# Patient Record
Sex: FEMALE | Race: OTHER | NOT HISPANIC OR LATINO | URBAN - METROPOLITAN AREA
[De-identification: names, ages, dates, MRNs, and addresses within clinical notes are randomized per-mention and may not be internally consistent; named-entity substitution may affect disease eponyms.]

---

## 2022-06-15 ENCOUNTER — INPATIENT (INPATIENT)
Facility: HOSPITAL | Age: 82
LOS: 13 days | Discharge: HOSPICE | DRG: 698 | End: 2022-06-29
Attending: HOSPITALIST | Admitting: HOSPITALIST
Payer: MEDICARE

## 2022-06-15 VITALS
WEIGHT: 143.08 LBS | RESPIRATION RATE: 18 BRPM | SYSTOLIC BLOOD PRESSURE: 143 MMHG | HEIGHT: 64 IN | HEART RATE: 78 BPM | OXYGEN SATURATION: 97 % | DIASTOLIC BLOOD PRESSURE: 54 MMHG | TEMPERATURE: 98 F

## 2022-06-15 DIAGNOSIS — N17.9 ACUTE KIDNEY FAILURE, UNSPECIFIED: ICD-10-CM

## 2022-06-15 LAB
ALBUMIN SERPL ELPH-MCNC: 3 G/DL — LOW (ref 3.5–5)
ALP SERPL-CCNC: 163 U/L — HIGH (ref 40–120)
ALT FLD-CCNC: 456 U/L DA — HIGH (ref 10–60)
ANION GAP SERPL CALC-SCNC: 18 MMOL/L — HIGH (ref 5–17)
AST SERPL-CCNC: 455 U/L — HIGH (ref 10–40)
BASOPHILS # BLD AUTO: 0.01 K/UL — SIGNIFICANT CHANGE UP (ref 0–0.2)
BASOPHILS NFR BLD AUTO: 0.2 % — SIGNIFICANT CHANGE UP (ref 0–2)
BILIRUB DIRECT SERPL-MCNC: 0.1 MG/DL — SIGNIFICANT CHANGE UP (ref 0–0.3)
BILIRUB INDIRECT FLD-MCNC: 0.4 MG/DL — SIGNIFICANT CHANGE UP (ref 0.2–1)
BILIRUB SERPL-MCNC: 0.5 MG/DL — SIGNIFICANT CHANGE UP (ref 0.2–1.2)
BUN SERPL-MCNC: 93 MG/DL — HIGH (ref 7–18)
CALCIUM SERPL-MCNC: 7.2 MG/DL — LOW (ref 8.4–10.5)
CHLORIDE SERPL-SCNC: 108 MMOL/L — SIGNIFICANT CHANGE UP (ref 96–108)
CO2 SERPL-SCNC: 8 MMOL/L — CRITICAL LOW (ref 22–31)
CREAT SERPL-MCNC: 8.28 MG/DL — HIGH (ref 0.5–1.3)
EGFR: 5 ML/MIN/1.73M2 — LOW
EOSINOPHIL # BLD AUTO: 0.01 K/UL — SIGNIFICANT CHANGE UP (ref 0–0.5)
EOSINOPHIL NFR BLD AUTO: 0.2 % — SIGNIFICANT CHANGE UP (ref 0–6)
GAS PNL BLDV: SIGNIFICANT CHANGE UP
GLUCOSE SERPL-MCNC: 82 MG/DL — SIGNIFICANT CHANGE UP (ref 70–99)
HCT VFR BLD CALC: 29.6 % — LOW (ref 34.5–45)
HGB BLD-MCNC: 9.1 G/DL — LOW (ref 11.5–15.5)
HIV 1 & 2 AB SERPL IA.RAPID: SIGNIFICANT CHANGE UP
IMM GRANULOCYTES NFR BLD AUTO: 1.5 % — SIGNIFICANT CHANGE UP (ref 0–1.5)
LACTATE SERPL-SCNC: 0.3 MMOL/L — LOW (ref 0.7–2)
LIDOCAIN IGE QN: 560 U/L — HIGH (ref 73–393)
LYMPHOCYTES # BLD AUTO: 1.61 K/UL — SIGNIFICANT CHANGE UP (ref 1–3.3)
LYMPHOCYTES # BLD AUTO: 24.9 % — SIGNIFICANT CHANGE UP (ref 13–44)
MAGNESIUM SERPL-MCNC: 2.5 MG/DL — SIGNIFICANT CHANGE UP (ref 1.6–2.6)
MCHC RBC-ENTMCNC: 27.4 PG — SIGNIFICANT CHANGE UP (ref 27–34)
MCHC RBC-ENTMCNC: 30.7 GM/DL — LOW (ref 32–36)
MCV RBC AUTO: 89.2 FL — SIGNIFICANT CHANGE UP (ref 80–100)
MONOCYTES # BLD AUTO: 0.35 K/UL — SIGNIFICANT CHANGE UP (ref 0–0.9)
MONOCYTES NFR BLD AUTO: 5.4 % — SIGNIFICANT CHANGE UP (ref 2–14)
NEUTROPHILS # BLD AUTO: 4.38 K/UL — SIGNIFICANT CHANGE UP (ref 1.8–7.4)
NEUTROPHILS NFR BLD AUTO: 67.8 % — SIGNIFICANT CHANGE UP (ref 43–77)
NRBC # BLD: 0 /100 WBCS — SIGNIFICANT CHANGE UP (ref 0–0)
OB PNL STL: POSITIVE
PLATELET # BLD AUTO: 152 K/UL — SIGNIFICANT CHANGE UP (ref 150–400)
POTASSIUM SERPL-MCNC: 6.3 MMOL/L — CRITICAL HIGH (ref 3.5–5.3)
POTASSIUM SERPL-SCNC: 6.3 MMOL/L — CRITICAL HIGH (ref 3.5–5.3)
PROT SERPL-MCNC: 7.4 G/DL — SIGNIFICANT CHANGE UP (ref 6–8.3)
RBC # BLD: 3.32 M/UL — LOW (ref 3.8–5.2)
RBC # FLD: 17.4 % — HIGH (ref 10.3–14.5)
SARS-COV-2 RNA SPEC QL NAA+PROBE: DETECTED
SODIUM SERPL-SCNC: 134 MMOL/L — LOW (ref 135–145)
TROPONIN I, HIGH SENSITIVITY RESULT: 24.8 NG/L — SIGNIFICANT CHANGE UP
WBC # BLD: 6.46 K/UL — SIGNIFICANT CHANGE UP (ref 3.8–10.5)
WBC # FLD AUTO: 6.46 K/UL — SIGNIFICANT CHANGE UP (ref 3.8–10.5)

## 2022-06-15 PROCEDURE — 99291 CRITICAL CARE FIRST HOUR: CPT | Mod: CS

## 2022-06-15 PROCEDURE — 71045 X-RAY EXAM CHEST 1 VIEW: CPT | Mod: 26

## 2022-06-15 RX ORDER — SODIUM ZIRCONIUM CYCLOSILICATE 10 G/10G
10 POWDER, FOR SUSPENSION ORAL ONCE
Refills: 0 | Status: COMPLETED | OUTPATIENT
Start: 2022-06-15 | End: 2022-06-15

## 2022-06-15 RX ORDER — DEXTROSE 50 % IN WATER 50 %
25 SYRINGE (ML) INTRAVENOUS ONCE
Refills: 0 | Status: COMPLETED | OUTPATIENT
Start: 2022-06-15 | End: 2022-06-15

## 2022-06-15 RX ORDER — PIPERACILLIN AND TAZOBACTAM 4; .5 G/20ML; G/20ML
3.38 INJECTION, POWDER, LYOPHILIZED, FOR SOLUTION INTRAVENOUS ONCE
Refills: 0 | Status: COMPLETED | OUTPATIENT
Start: 2022-06-15 | End: 2022-06-15

## 2022-06-15 RX ORDER — ALBUTEROL 90 UG/1
2.5 AEROSOL, METERED ORAL ONCE
Refills: 0 | Status: COMPLETED | OUTPATIENT
Start: 2022-06-15 | End: 2022-06-15

## 2022-06-15 RX ORDER — CHLORHEXIDINE GLUCONATE 213 G/1000ML
1 SOLUTION TOPICAL
Refills: 0 | Status: DISCONTINUED | OUTPATIENT
Start: 2022-06-15 | End: 2022-06-17

## 2022-06-15 RX ORDER — SODIUM CHLORIDE 9 MG/ML
1000 INJECTION INTRAMUSCULAR; INTRAVENOUS; SUBCUTANEOUS ONCE
Refills: 0 | Status: COMPLETED | OUTPATIENT
Start: 2022-06-15 | End: 2022-06-15

## 2022-06-15 RX ORDER — PANTOPRAZOLE SODIUM 20 MG/1
40 TABLET, DELAYED RELEASE ORAL EVERY 12 HOURS
Refills: 0 | Status: DISCONTINUED | OUTPATIENT
Start: 2022-06-15 | End: 2022-06-16

## 2022-06-15 RX ORDER — INSULIN HUMAN 100 [IU]/ML
5 INJECTION, SOLUTION SUBCUTANEOUS ONCE
Refills: 0 | Status: COMPLETED | OUTPATIENT
Start: 2022-06-15 | End: 2022-06-15

## 2022-06-15 RX ORDER — DIATRIZOATE MEGLUMINE 180 MG/ML
30 INJECTION, SOLUTION INTRAVESICAL ONCE
Refills: 0 | Status: DISCONTINUED | OUTPATIENT
Start: 2022-06-15 | End: 2022-06-15

## 2022-06-15 RX ORDER — SODIUM CHLORIDE 9 MG/ML
1000 INJECTION, SOLUTION INTRAVENOUS
Refills: 0 | Status: DISCONTINUED | OUTPATIENT
Start: 2022-06-15 | End: 2022-06-17

## 2022-06-15 RX ORDER — CALCIUM GLUCONATE 100 MG/ML
2 VIAL (ML) INTRAVENOUS ONCE
Refills: 0 | Status: COMPLETED | OUTPATIENT
Start: 2022-06-15 | End: 2022-06-15

## 2022-06-15 RX ORDER — PIPERACILLIN AND TAZOBACTAM 4; .5 G/20ML; G/20ML
3.38 INJECTION, POWDER, LYOPHILIZED, FOR SOLUTION INTRAVENOUS EVERY 12 HOURS
Refills: 0 | Status: DISCONTINUED | OUTPATIENT
Start: 2022-06-15 | End: 2022-06-18

## 2022-06-15 RX ORDER — SODIUM BICARBONATE 1 MEQ/ML
100 SYRINGE (ML) INTRAVENOUS ONCE
Refills: 0 | Status: COMPLETED | OUTPATIENT
Start: 2022-06-15 | End: 2022-06-15

## 2022-06-15 RX ADMIN — PIPERACILLIN AND TAZOBACTAM 3.38 GRAM(S): 4; .5 INJECTION, POWDER, LYOPHILIZED, FOR SOLUTION INTRAVENOUS at 23:50

## 2022-06-15 RX ADMIN — Medication 25 GRAM(S): at 23:06

## 2022-06-15 RX ADMIN — PIPERACILLIN AND TAZOBACTAM 200 GRAM(S): 4; .5 INJECTION, POWDER, LYOPHILIZED, FOR SOLUTION INTRAVENOUS at 23:06

## 2022-06-15 RX ADMIN — ALBUTEROL 2.5 MILLIGRAM(S): 90 AEROSOL, METERED ORAL at 23:34

## 2022-06-15 RX ADMIN — PANTOPRAZOLE SODIUM 40 MILLIGRAM(S): 20 TABLET, DELAYED RELEASE ORAL at 23:05

## 2022-06-15 RX ADMIN — Medication 200 GRAM(S): at 23:05

## 2022-06-15 RX ADMIN — INSULIN HUMAN 5 UNIT(S): 100 INJECTION, SOLUTION SUBCUTANEOUS at 23:06

## 2022-06-15 RX ADMIN — SODIUM CHLORIDE 1000 MILLILITER(S): 9 INJECTION INTRAMUSCULAR; INTRAVENOUS; SUBCUTANEOUS at 22:28

## 2022-06-15 RX ADMIN — Medication 2 GRAM(S): at 23:35

## 2022-06-15 RX ADMIN — SODIUM CHLORIDE 1000 MILLILITER(S): 9 INJECTION INTRAMUSCULAR; INTRAVENOUS; SUBCUTANEOUS at 21:28

## 2022-06-15 RX ADMIN — Medication 100 MILLIEQUIVALENT(S): at 23:06

## 2022-06-15 NOTE — H&P ADULT - ATTENDING COMMENTS
80 y/o with PMH of HTN, CVA, HLD, CKD brought to ER with poor appetite, vomiting and diarrhea.    A/P ARF on top of CKD   Hyper K   GI Bleed   Anemia  COVID    MICU   Bicarb drip   Monitor renal function, urinary output  Correct hyperK   NPO   Iv protonix   PPI BID   Monitor CBC  Nephrology eval appreciated  Abd US   Panculure   Iv antibiotic

## 2022-06-15 NOTE — H&P ADULT - ASSESSMENT
81 year old female from home with HHA, with PMH of CVA, HTN, HLD, SBO and renal insufficiency in the past, later improved, chronic Perez? presents to the ED with c/o vomiting, diarrhea and cough x 2 days. In ED, K was 6.3, Cr 8.28, bicarb 8. Hb 9.0. Occult +. COVID +. Admitted to ICU for acute renal failure and metabolic acidosis.    ASSESSMENT:  -Acute renal failure  -Hyperkalemia  -Anion gap metabolic acidosis  -GI bleed  -COVID infection  -Transaminitis  -Anemia    PLAN:    #NEURO    #PULMONARY    #CARDIOVASCULAR    #GASTROINTESTINAL    #RENAL    #INFECTIOUS DISEASE    #ENDOCRINE    #HEME    #  PROPHYLAXIS  -DVT  -GI    DISPO  CODE STATUS 81 year old female from home with HHA, with PMH of CVA, HTN, HLD, SBO and renal insufficiency in the past, later improved, chronic Perez? presents to the ED with c/o vomiting, diarrhea and cough x 2 days. In ED, K was 6.3, Cr 8.28, bicarb 8. Hb 9.0. Occult +. COVID +. Admitted to ICU for acute renal failure and metabolic acidosis.    ASSESSMENT:  -Acute renal failure  -Hyperkalemia  -Anion gap metabolic acidosis  -GI bleed  -COVID infection  -Transaminitis  -Anemia    PLAN:    #NEURO  pt is AAO X 2-at baseline mental status  a bit more lethargic likely due to uremia    #PULMONARY    COVID 19 infection:  pt saturating well on RA  no indication for steroids or remdesivir  monitor oxygen sats    #CARDIOVASCULAR    #GASTROINTESTINAL    #RENAL    #INFECTIOUS DISEASE    #ENDOCRINE    #HEME    #  PROPHYLAXIS  -DVT  -GI    DISPO  CODE STATUS 81 year old female from home with HHA, with PMH of CVA, HTN, HLD, SBO and renal insufficiency in the past, later improved, chronic Perez? presents to the ED with c/o vomiting, diarrhea and cough x 2 days. In ED, K was 6.3, Cr 8.28, bicarb 8. Hb 9.0. Occult +. COVID +. Admitted to ICU for acute renal failure and metabolic acidosis.    ASSESSMENT:  -Acute renal failure  -Hyperkalemia  -Anion gap metabolic acidosis  -GI bleed  -COVID infection  -Transaminitis  -Anemia    PLAN:    #NEURO  pt is AAO X 2-at baseline mental status  a bit more lethargic likely due to uremia    #PULMONARY    COVID 19 infection:  pt saturating well on RA  no indication for steroids or remdesivir  monitor oxygen sats    #CARDIOVASCULAR  no acute issues    #GASTROINTESTINAL    GI bleed:  pt p/w Hb 9.1  occult +  reported to have dark urine   keep NPO  c/w IVF  Protonix drip  trend CBC  GI consult in am     Transaminitis:  pt p/w ALT//455  CT abd pending  pt also has elevated lipase 560  RUQ sono if CT unable to be obtained  trend BMP    #RENAL    Acute renal failure:  pt p/w Cr 8.28, BUN 93, bicarb 8, pH 7.04  according to son, pt Cr was normal one month ago  follow UA, urine lytes  follow CT abd  pt does not want HD  nephro consulted Dr Coyle   started on bicarb drip  follow repeat BMP    Hyperkalemia:  K 6.3 on admission  s/p insulin, dextrose, lokelma, calcium gluconate  follow repeat BMP  repeat cocktail as needed  nephro on board Dr. Coyle     #INFECTIOUS DISEASE  Urine looks very dark-concern for infection given h/o UTI  started empirically on zosyn for concern of pyelo  follow urine and blood cultures     #ENDOCRINE  no acute issues     #HEME    Anemia:  p/w Hb 9.1   occult +   management as above  trend CBC q12h     #  PROPHYLAXIS  -DVT: SCD  -GI: protonix     DISPO: admit to ICU  CODE STATUS: DNR/DNI/no central line  81 year old female from home with HHA, with PMH of CVA, HTN, HLD, SBO and renal insufficiency in the past, later improved, chronic Perez? presents to the ED with c/o vomiting, diarrhea and cough x 2 days. In ED, K was 6.3, Cr 8.28, bicarb 8. Hb 9.0. Occult +. COVID +. Admitted to ICU for acute renal failure and metabolic acidosis.    ASSESSMENT:  -Acute renal failure  -Hyperkalemia  -Anion gap metabolic acidosis  -GI bleed  -COVID infection  -Transaminitis  -Anemia    PLAN:    #NEURO  pt is AAO X 2-at baseline mental status  a bit more lethargic likely due to uremia    #PULMONARY    COVID 19 infection:  pt saturating well on RA  no indication for steroids or remdesivir  monitor oxygen sats    #CARDIOVASCULAR  no acute issues    #GASTROINTESTINAL    GI bleed:  pt p/w Hb 9.1  occult +  reported to have dark urine   keep NPO  c/w IVF  Protonix drip  trend CBC  GI consult in am     Transaminitis:  pt p/w ALT//455  CT abd pending  pt also has elevated lipase 560  RUQ sono if CT unable to be obtained  trend BMP    #RENAL    Acute renal failure:  pt p/w Cr 8.28, BUN 93, bicarb 8, pH 7.04  according to son, pt Cr was normal one month ago  follow UA, urine lytes  follow CT abd  pt does not want HD  nephro consulted Dr Coyle   started on bicarb drip  follow repeat BMP    Hyperkalemia:  K 6.3 on admission  s/p insulin, dextrose, lokelma, calcium gluconate  follow repeat BMP  repeat cocktail as needed  nephro on board Dr. Coyle     #INFECTIOUS DISEASE  Urine looks very dark-concern for infection given h/o UTI  started empirically on zosyn for concern of pyelo  follow urine and blood cultures     #ENDOCRINE  no acute issues     #HEME    Anemia:  p/w Hb 9.1   occult +   management as above  trend CBC q8h     #  PROPHYLAXIS  -DVT: SCD  -GI: protonix     DISPO: admit to ICU  CODE STATUS: DNR/DNI/no central line

## 2022-06-15 NOTE — ED PROVIDER NOTE - SECONDARY DIAGNOSIS.
LIAM (acute kidney injury) Acute hyperkalemia Increased anion gap metabolic acidosis DNR (do not resuscitate) discussion 2019 novel coronavirus disease (COVID-19) Protein calorie malnutrition

## 2022-06-15 NOTE — ED PROVIDER NOTE - OBJECTIVE STATEMENT
81yoF with h/o ESRD, not on dialysis 2/2 to her own refusal (and was on hospice until her kidney function improved, though is still DNR/DNI), CVA, HTN, HLD, h/o SBO, on no antiplt/coag agents, presents with her son Reid ([phone]) due to vomiting NBNB emesis since yesterday, as well as black tarry stool, and nonradiating L flank pain. Associated cough. Took Tylenol a few hrs PTA.

## 2022-06-15 NOTE — ED ADULT NURSE NOTE - NSHOSCREENINGQ1_ED_ALL_ED
SUBJECTIVE:  Patient stated that on April 30, she noticed a bruise over her right breast. She did a breast exam and noticed a lump over the area of the bruise. There is no tenderness of the area nor discomfort.    PHYSICAL EXAMINATION:  Vital Signs:   Visit Vitals   • /84   • Pulse 76   • SpO2 99%     Ecchymosis noted over right breast over the medial lower quadrant. There is a discrete mobile nodule noted in the area of the bruise that is nontender. This is not noted in the contralateral breast in the same area.    IMPRESSION AND PLAN:  Ecchymosis with associated breast lesion: Evaluate further with ultrasound and mammography.    She should also would like a refill of the Valtrex for recurrent cold sores. Refill sent to pharmacy.    
Sarina Doran is a 63 year old female presenting with c/o bruise and lump on her right breast x 3 days.    Medications reviewed and updated.  Denies known Latex allergy or symptoms of Latex sensitivity.  History   Smoking Status   • Former Smoker   • Packs/day: 1.00   • Years: 40.00   • Types: Cigarettes   • Quit date: 8/1/2012   Smokeless Tobacco   • Never Used     Comment: several couple of cigs- 1ppd     Health Maintenance Summary     Topic Due On Due Status Completed On Postpone Until Reason    MAMMOGRAM - BREAST CANCER SCREENING May 18, 2018 Not Due May 18, 2016      Colorectal Cancer Screening - Colonoscopy Feb 27, 2025 Not Due Feb 27, 2015      Pap Smear - Cervical Cancer Screening  May 2, 2021 Not Due May 2, 2016      Immunization-Zoster Jun 13, 2014 Postponed  Nov 28, 2017 Insurance or Financial    Immunization - TDAP Pregnancy  Hidden       IMMUNIZATION - DTaP/Tdap/Td Apr 12, 2023 Not Due Apr 12, 2013      Immunization-Influenza  Completed Nov 17, 2016            Patient is due for topics as listed above, she wishes to discuss with provider .        
No

## 2022-06-15 NOTE — H&P ADULT - NSHPPHYSICALEXAM_GEN_ALL_CORE
PHYSICAL EXAM:  GENERAL: NAD, lying in bed comfortably, warm to touch   HEAD:  Atraumatic, Normocephalic  EYES: EOMI, PERRLA, conjunctiva and sclera clear  ENT: Moist mucous membranes  NECK: Supple, No JVD  CHEST/LUNG: Clear to auscultation bilaterally  HEART: Regular rate and rhythm; systolic murmur  ABDOMEN: Soft, Nontender, Nondistended.  EXTREMITIES:  No clubbing, cyanosis, or edema  : Perez in place with black urine  NERVOUS SYSTEM:  Alert & Oriented X2-3    SKIN: No rashes or lesions

## 2022-06-15 NOTE — ED PROVIDER NOTE - CARE PLAN
Principal Discharge DX:	Acute renal failure  Secondary Diagnosis:	LIAM (acute kidney injury)  Secondary Diagnosis:	Acute hyperkalemia   1 Principal Discharge DX:	Acute renal failure  Secondary Diagnosis:	LIAM (acute kidney injury)  Secondary Diagnosis:	Acute hyperkalemia  Secondary Diagnosis:	Increased anion gap metabolic acidosis  Secondary Diagnosis:	2019 novel coronavirus disease (COVID-19)  Secondary Diagnosis:	DNR (do not resuscitate) discussion  Secondary Diagnosis:	Protein calorie malnutrition

## 2022-06-15 NOTE — ED PROVIDER NOTE - PHYSICAL EXAMINATION
Afebrile, hemodynamically stable, saturating well  NAD, elderly, nontoxic appearing, laying comfortably in bed, no WOB, speaking full sentences  Head NCAT  EOMI grossly, anicteric  MM dry  No JVD  RRR, nml S1/S2, no m/r/g  Lungs CTAB, no w/r/r  Abd soft, mild LUQ TTP, ND, nml BS, no rebound or guarding, + L CVAT  Rectal: brown stool  AAO, CN's 3-12 grossly intact  PARK spontaneously, no leg cyanosis or edema  Skin warm, dry, no rashes or hives

## 2022-06-15 NOTE — ED ADULT NURSE NOTE - NSIMPLEMENTINTERV_GEN_ALL_ED
Implemented All Fall Risk Interventions:  [place] to call system. Call bell, personal items and telephone within reach. Instruct patient to call for assistance. Room bathroom lighting operational. Non-slip footwear when patient is off stretcher. Physically safe environment: no spills, clutter or unnecessary equipment. Stretcher in lowest position, wheels locked, appropriate side rails in place. Provide visual cue, wrist band, yellow gown, etc. Monitor gait and stability. Monitor for mental status changes and reorient to person, place, and time. Review medications for side effects contributing to fall risk. Reinforce activity limits and safety measures with patient and family.

## 2022-06-15 NOTE — CONSULT NOTE ADULT - SUBJECTIVE AND OBJECTIVE BOX
MAXIMINO MASCORRO  81y  Patient is a 81y old  Female who presents with a chief complaint of   HPI:  I was asked to see this patient for Hyperkalemia and LIAM.  As per her son, she was told she had ESRD and she refused HD, opting for hospice. However, her Cr fell to 2 recently. Cause of ESRD was unknown but was suspected to be due to obstruction as he had an indwelling cardoso since. She now has very dark urine in the cardoso for the last few days. No fever or chills but has flank pains.  HEALTH ISSUES - PROBLEM Dx:        MEDICATIONS  (STANDING):  ALBUTerol    0.083%. 2.5 milliGRAM(s) Nebulizer once  calcium gluconate IVPB 2 Gram(s) IV Intermittent Once  dextrose 50% Injectable 25 Gram(s) IV Push once  insulin regular  human recombinant. 5 Unit(s) IV Push once  pantoprazole  Injectable 40 milliGRAM(s) IV Push every 12 hours  piperacillin/tazobactam IVPB... 3.375 Gram(s) IV Intermittent once  sodium bicarbonate  Injectable 100 milliEquivalent(s) IV Push Once  sodium zirconium cyclosilicate 10 Gram(s) Oral Once    MEDICATIONS  (PRN):    Vital Signs Last 24 Hrs  T(C): 36.3 (15 Hossein 2022 20:22), Max: 36.7 (15 Hossein 2022 19:27)  T(F): 97.3 (15 Hossein 2022 20:22), Max: 98.1 (15 Hossein 2022 19:27)  HR: 78 (15 Hossein 2022 19:27) (78 - 78)  BP: 143/54 (15 Hossein 2022 19:27) (143/54 - 143/54)  BP(mean): --  RR: 18 (15 Hossein 2022 19:27) (18 - 18)  SpO2: 97% (15 Hossein 2022 19:27) (97% - 97%)  Daily Height in cm: 162.56 (15 Hossein 2022 19:27)    Daily     PHYSICAL EXAM:  Constitutional:  She appears comfortable and not distressed. Not diaphoretic.    Neck:  The thyroid is normal. Trachea is midline.     Respiratory: The lungs are clear to auscultation. No dullness and expansion is normal.    Cardiovascular: S1 and S2 are normal. No mummurs, rubs or gallops are present.    Gastrointestinal: The abdomen is soft. No tenderness is present. No masses are present. Bowel sounds are normal.    Genitourinary: The bladder is not distended. No CVA tenderness is present. Cardoso with very dark urine.    Extremities: No edema is noted. No deformities are present.    Neurological: Cognition is normal. Tone, power and sensation are normal. [place] of hearing.    Skin: No lesions are seen  or palpated.    Lymph Nodes: No lymphadenopathy is present.                              9.1    6.46  )-----------( 152      ( 15 Hossein 2022 21:07 )             29.6     06-15    134<L>  |  108  |  93<H>  ----------------------------<  82  6.3<HH>   |  8<LL>  |  8.28<H>    Ca    7.2<L>      15 Hossein 2022 21:07  Mg     2.5     06-15    TPro  7.4  /  Alb  3.0<L>  /  TBili  0.5  /  DBili  0.1  /  AST  455<H>  /  ALT  456<H>  /  AlkPhos  163<H>  06-15

## 2022-06-15 NOTE — ED ADULT NURSE NOTE - NSICDXPASTMEDICALHX_GEN_ALL_CORE_FT
PAST MEDICAL HISTORY:  CRI (chronic renal insufficiency)     CVA (cerebrovascular accident)     HTN (hypertension)     SBO (small bowel obstruction)

## 2022-06-15 NOTE — ED ADULT NURSE NOTE - CAS TRG GENERAL AIRWAY, MLM
CILOSTAZOL 100 MG 2 TIMES A DAY 90 DAY SUPPLY      [facility]       REQUESTING A 14 DAY SUPPLY TO BE SENT TO [facility], BEFORE MEDICATIONS COME IN THROUGH [facility].       Patent

## 2022-06-15 NOTE — ED PROVIDER NOTE - CLINICAL SUMMARY MEDICAL DECISION MAKING FREE TEXT BOX
Character low Character low suspicion for acute intraabdominal process or for dissection/AAA or mesenteric ischemia. Vomiting/dark stools c/w cause of her noted LIAM. No active bleeding. Given fluids, meds for hyperkalemia. DNR/DNI/no dialysis. Nephro saw pt. NAD, hemodynamically stable. Admitted to ICU.

## 2022-06-15 NOTE — H&P ADULT - HISTORY OF PRESENT ILLNESS
81yoF with h/o ESRD, not on dialysis 2/2 to her own refusal (and was on hospice until her kidney function improved, though is still DNR/DNI), CVA, HTN, HLD, h/o SBO, on no antiplt/coag agents, presents with her son Reid ([phone]) due to vomiting NBNB emesis since yesterday, as well as black tarry stool, and nonradiating L flank pain. Associated cough. Took Tylenol a few hrs PTA. 81 year old female from home with HHA, with PMH of CVA, HTN, HLD, SBO and renal insufficiency in the past, later improved, chronic Perez? presents to the ED with c/o vomiting. As per son Reid ([phone]), patient has been having vomiting x 2 days- non-bloody and white in color. She also has been having diarrhea x 2 days, cough with phlegm but no fever. He also reports her urine being much darker but very little in amount. Patient had ESRD in the past and was offered HD but refused and was discharged home with home hospice. However, she got better and was taken off hospice. Patient remains DNR/ DNI/ no hemodialysis or central line.

## 2022-06-16 LAB
ALBUMIN SERPL ELPH-MCNC: 2.9 G/DL — LOW (ref 3.5–5)
ALP SERPL-CCNC: 149 U/L — HIGH (ref 40–120)
ALT FLD-CCNC: 399 U/L DA — HIGH (ref 10–60)
ANION GAP SERPL CALC-SCNC: 19 MMOL/L — HIGH (ref 5–17)
APPEARANCE UR: ABNORMAL
APTT BLD: 46.2 SEC — HIGH (ref 27.5–35.5)
AST SERPL-CCNC: 350 U/L — HIGH (ref 10–40)
BASE EXCESS BLDV CALC-SCNC: -17.1 MMOL/L — SIGNIFICANT CHANGE UP
BILIRUB SERPL-MCNC: 0.5 MG/DL — SIGNIFICANT CHANGE UP (ref 0.2–1.2)
BILIRUB UR-MCNC: NEGATIVE — SIGNIFICANT CHANGE UP
BLOOD GAS COMMENTS, VENOUS: SIGNIFICANT CHANGE UP
BUN SERPL-MCNC: 93 MG/DL — HIGH (ref 7–18)
CA-I SERPL-SCNC: SIGNIFICANT CHANGE UP MMOL/L (ref 1.15–1.33)
CALCIUM SERPL-MCNC: 7.8 MG/DL — LOW (ref 8.4–10.5)
CHLORIDE SERPL-SCNC: 110 MMOL/L — HIGH (ref 96–108)
CO2 SERPL-SCNC: 10 MMOL/L — CRITICAL LOW (ref 22–31)
COLOR SPEC: ABNORMAL
CREAT ?TM UR-MCNC: 55 MG/DL — SIGNIFICANT CHANGE UP
CREAT SERPL-MCNC: 8.15 MG/DL — HIGH (ref 0.5–1.3)
DIFF PNL FLD: ABNORMAL
EGFR: 5 ML/MIN/1.73M2 — LOW
GAS PNL BLDV: 136 MMOL/L — SIGNIFICANT CHANGE UP (ref 136–145)
GAS PNL BLDV: SIGNIFICANT CHANGE UP
GLUCOSE SERPL-MCNC: 91 MG/DL — SIGNIFICANT CHANGE UP (ref 70–99)
GLUCOSE UR QL: NEGATIVE — SIGNIFICANT CHANGE UP
HCO3 BLDV-SCNC: 11 MMOL/L — LOW (ref 22–29)
HCT VFR BLD CALC: 28 % — LOW (ref 34.5–45)
HGB BLD-MCNC: 8.4 G/DL — LOW (ref 11.5–15.5)
HOROWITZ INDEX BLDV+IHG-RTO: 21 — SIGNIFICANT CHANGE UP
INR BLD: 1.02 RATIO — SIGNIFICANT CHANGE UP (ref 0.88–1.16)
KETONES UR-MCNC: NEGATIVE — SIGNIFICANT CHANGE UP
LACTATE BLDV-MCNC: 1.4 MMOL/L — SIGNIFICANT CHANGE UP (ref 0.5–2)
LEUKOCYTE ESTERASE UR-ACNC: ABNORMAL
MAGNESIUM SERPL-MCNC: 2.4 MG/DL — SIGNIFICANT CHANGE UP (ref 1.6–2.6)
MCHC RBC-ENTMCNC: 26.5 PG — LOW (ref 27–34)
MCHC RBC-ENTMCNC: 30 GM/DL — LOW (ref 32–36)
MCV RBC AUTO: 88.3 FL — SIGNIFICANT CHANGE UP (ref 80–100)
MRSA PCR RESULT.: SIGNIFICANT CHANGE UP
NITRITE UR-MCNC: NEGATIVE — SIGNIFICANT CHANGE UP
NRBC # BLD: 0 /100 WBCS — SIGNIFICANT CHANGE UP (ref 0–0)
OSMOLALITY UR: 306 MOS/KG — SIGNIFICANT CHANGE UP (ref 50–1200)
PCO2 BLDV: 33 MMHG — LOW (ref 39–42)
PH BLDV: 7.13 — LOW (ref 7.32–7.43)
PH UR: 9 — HIGH (ref 5–8)
PHOSPHATE SERPL-MCNC: 6.4 MG/DL — HIGH (ref 2.5–4.5)
PLATELET # BLD AUTO: 147 K/UL — LOW (ref 150–400)
PO2 BLDV: 34 MMHG — SIGNIFICANT CHANGE UP
POTASSIUM BLDV-SCNC: 4.9 MMOL/L — SIGNIFICANT CHANGE UP (ref 3.5–5.1)
POTASSIUM SERPL-MCNC: 4.8 MMOL/L — SIGNIFICANT CHANGE UP (ref 3.5–5.3)
POTASSIUM SERPL-SCNC: 4.8 MMOL/L — SIGNIFICANT CHANGE UP (ref 3.5–5.3)
PROT SERPL-MCNC: 7.1 G/DL — SIGNIFICANT CHANGE UP (ref 6–8.3)
PROT UR-MCNC: 100
PROTHROM AB SERPL-ACNC: 12.2 SEC — SIGNIFICANT CHANGE UP (ref 10.5–13.4)
RBC # BLD: 3.17 M/UL — LOW (ref 3.8–5.2)
RBC # FLD: 17.2 % — HIGH (ref 10.3–14.5)
S AUREUS DNA NOSE QL NAA+PROBE: SIGNIFICANT CHANGE UP
SAO2 % BLDV: 67.1 % — SIGNIFICANT CHANGE UP
SODIUM SERPL-SCNC: 139 MMOL/L — SIGNIFICANT CHANGE UP (ref 135–145)
SODIUM UR-SCNC: 54 MMOL/L — SIGNIFICANT CHANGE UP
SP GR SPEC: 1.01 — SIGNIFICANT CHANGE UP (ref 1.01–1.02)
UROBILINOGEN FLD QL: NEGATIVE — SIGNIFICANT CHANGE UP
WBC # BLD: 7.29 K/UL — SIGNIFICANT CHANGE UP (ref 3.8–10.5)
WBC # FLD AUTO: 7.29 K/UL — SIGNIFICANT CHANGE UP (ref 3.8–10.5)

## 2022-06-16 PROCEDURE — 99497 ADVNCD CARE PLAN 30 MIN: CPT | Mod: 25

## 2022-06-16 PROCEDURE — 99291 CRITICAL CARE FIRST HOUR: CPT

## 2022-06-16 PROCEDURE — 99223 1ST HOSP IP/OBS HIGH 75: CPT

## 2022-06-16 RX ORDER — ACETAMINOPHEN 500 MG
650 TABLET ORAL EVERY 6 HOURS
Refills: 0 | Status: DISCONTINUED | OUTPATIENT
Start: 2022-06-16 | End: 2022-06-25

## 2022-06-16 RX ORDER — PANTOPRAZOLE SODIUM 20 MG/1
8 TABLET, DELAYED RELEASE ORAL
Qty: 80 | Refills: 0 | Status: DISCONTINUED | OUTPATIENT
Start: 2022-06-16 | End: 2022-06-17

## 2022-06-16 RX ORDER — TRAMADOL HYDROCHLORIDE 50 MG/1
50 TABLET ORAL ONCE
Refills: 0 | Status: DISCONTINUED | OUTPATIENT
Start: 2022-06-16 | End: 2022-06-16

## 2022-06-16 RX ADMIN — PANTOPRAZOLE SODIUM 10 MG/HR: 20 TABLET, DELAYED RELEASE ORAL at 14:24

## 2022-06-16 RX ADMIN — TRAMADOL HYDROCHLORIDE 50 MILLIGRAM(S): 50 TABLET ORAL at 04:07

## 2022-06-16 RX ADMIN — SODIUM CHLORIDE 100 MILLILITER(S): 9 INJECTION, SOLUTION INTRAVENOUS at 14:32

## 2022-06-16 RX ADMIN — PIPERACILLIN AND TAZOBACTAM 25 GRAM(S): 4; .5 INJECTION, POWDER, LYOPHILIZED, FOR SOLUTION INTRAVENOUS at 17:06

## 2022-06-16 RX ADMIN — SODIUM CHLORIDE 100 MILLILITER(S): 9 INJECTION, SOLUTION INTRAVENOUS at 01:10

## 2022-06-16 RX ADMIN — PANTOPRAZOLE SODIUM 10 MG/HR: 20 TABLET, DELAYED RELEASE ORAL at 04:01

## 2022-06-16 RX ADMIN — SODIUM ZIRCONIUM CYCLOSILICATE 10 GRAM(S): 10 POWDER, FOR SUSPENSION ORAL at 01:07

## 2022-06-16 RX ADMIN — PIPERACILLIN AND TAZOBACTAM 25 GRAM(S): 4; .5 INJECTION, POWDER, LYOPHILIZED, FOR SOLUTION INTRAVENOUS at 05:37

## 2022-06-16 RX ADMIN — TRAMADOL HYDROCHLORIDE 50 MILLIGRAM(S): 50 TABLET ORAL at 03:36

## 2022-06-16 RX ADMIN — CHLORHEXIDINE GLUCONATE 1 APPLICATION(S): 213 SOLUTION TOPICAL at 05:36

## 2022-06-16 NOTE — CONSULT NOTE ADULT - SUBJECTIVE AND OBJECTIVE BOX
Consult to: Discuss complex medical decision making related to goals of care    [facility]iatric and Palliative Consult Service:  Dr. Linda Beebe: cell ([phone])  Dr. Joann Lord: cell ([phone])   Donnie Correa NP: cell ([phone])   Kim Madrigal NP: cell ([phone])  Sb Isaiah LMSW: cell ([phone])     HPI:  81 year old female from home with HHA, with PMH of CVA, HTN, HLD, SBO and renal insufficiency in the past, later improved, chronic Perez? presents to the ED with c/o vomiting. As per son Reid ([phone]), patient has been having vomiting x 2 days- non-bloody and white in color. She also has been having diarrhea x 2 days, cough with phlegm but no fever. He also reports her urine being much darker but very little in amount. Patient had ESRD in the past and was offered HD but refused and was discharged home with home hospice. However, she got better and was taken off hospice. Patient remains DNR/ DNI/ no hemodialysis or central line.  (15 Hossein 2022 23:02)    Interval history; pt alert, answers simple questions. DNR/DNI on file.       PAST MEDICAL & SURGICAL HISTORY:  HTN (hypertension)      CVA (cerebrovascular accident)      CRI (chronic renal insufficiency)      SBO (small bowel obstruction)      No significant past surgical history          SOCIAL HISTORY:  has children  Admitted from:  home  (with HHA)      [ none ] Substance abuse, [ none ] Tobacco hx, [ none ] Alcohol hx, [ none ] Home Opioid Hx    FAMILY HISTORY:  No pertinent family history in first degree relatives     Baseline ADLs (prior to admission): alert, WCbound    Allergies    No Known Allergies    Intolerances      Present Symptoms:      Review of Systems:  limited due to poor mentation     MEDICATIONS  (STANDING):  chlorhexidine 2% Cloths 1 Application(s) Topical <User Schedule>  dextrose 5% 1000 milliLiter(s) (100 mL/Hr) IV Continuous <Continuous>  pantoprazole Infusion 8 mG/Hr (10 mL/Hr) IV Continuous <Continuous>  piperacillin/tazobactam IVPB.. 3.375 Gram(s) IV Intermittent every 12 hours    MEDICATIONS  (PRN):  acetaminophen     Tablet .. 650 milliGRAM(s) Oral every 6 hours PRN Mild Pain (1 - 3)      PHYSICAL EXAM:  Vital Signs Last 24 Hrs  T(C): 36.2 (2022 13:00), Max: 36.7 (15 Hossein 2022 19:27)  T(F): 97.2 (2022 13:00), Max: 98.1 (15 Hossein 2022 19:27)  HR: 80 (2022 15:00) (66 - 95)  BP: 133/74 (2022 15:00) (97/52 - 154/134)  BP(mean): 86 (2022 15:00) (54 - 139)  RR: 14 (2022 15:00) (10 - 19)  SpO2: 100% (2022 15:00) (97% - 100%)       Palliative Performance Scale/Karnofsky Score: 30      GENERAL: alert, NAD  HEENT: Atraumatic, oropharynx clear, neck supple  CHEST/LUNG: unlabored  HEART: Regular rate and rhythm    ABDOMEN: Soft, Nontender, Nondistended   MUSCULOSKELETAL:  No  edema,  bedbound  NERVOUS SYSTEM:  Alert & Oriented X2, answers simple questions,  follows commands  SKIN: No rashes or lesions noted  Oral intake: poor         LABS:                        8.4    7.29  )-----------( 147      ( 2022 03:42 )             28.0     06-16    139  |  110<H>  |  93<H>  ----------------------------<  91  4.8   |  10<LL>  |  8.15<H>    Ca    7.8<L>      2022 03:42  Phos  6.4     06-16  Mg     2.4     06-16    TPro  7.1  /  Alb  2.9<L>  /  TBili  0.5  /  DBili  x   /  AST  350<H>  /  ALT  399<H>  /  AlkPhos  149<H>  06-16    Urinalysis Basic - ( 2022 00:23 )    Color: Green / Appearance: Slightly Turbid / S.015 / pH: x  Gluc: x / Ketone: Negative  / Bili: Negative / Urobili: Negative   Blood: x / Protein: 100 / Nitrite: Negative   Leuk Esterase: Moderate / RBC: 5-10 /HPF / WBC 11-25 /HPF   Sq Epi: x / Non Sq Epi: Few /HPF / Bacteria: Moderate /HPF        RADIOLOGY & ADDITIONAL STUDIES:

## 2022-06-16 NOTE — CONSULT NOTE ADULT - PROBLEM SELECTOR RECOMMENDATION 3
WCbound at baseline, req assist w most ADLs, has 24h HHA. Son agreeable for home hospice referral once medically stable.

## 2022-06-16 NOTE — CONSULT NOTE ADULT - CONVERSATION DETAILS
Due to the patient's health status and restrictions on visitation during the Public Health Emergency, the Advance Care Planning service was performed via telephone with patient's __son_______.     Patient A&Ox2, defers to son for medical discussions. Spoke with patient's son Reid who stated that the patient was previously on home hospice w [facility] 6 mo ago for about 8 mo due to ESRD, pt refused dialysis. She was discharged from hospice services as her renal function stabilized, patient had been eating well up until 2 d ago when she began to feel unwell, developed n/v. She is WCbound at baseline, has 24h HHA. Confirmed wishes for DNR/DNI, no invasive or aggressive measures, no central lines. He is agreeable for conservative medical management. Agreeable for referral to home hospice again once medically stabilized. Support provided.

## 2022-06-16 NOTE — PROGRESS NOTE ADULT - ASSESSMENT
81 year old female from home with HHA, with PMH of CVA, HTN, HLD, SBO and renal insufficiency in the past, later improved, chronic Perez? presents to the ED with c/o vomiting, diarrhea and cough x 2 days. In ED, K was 6.3, Cr 8.28, bicarb 8. Hb 9.0. Occult +. COVID +. Admitted to ICU for acute renal failure and metabolic acidosis.    ASSESSMENT:  -Acute renal failure  -Hyperkalemia  -Anion gap metabolic acidosis  -GI bleed  -COVID infection  -Transaminitis  -Anemia    PLAN:    #NEURO  pt is AAO X 2-at baseline mental status  a bit more lethargic likely due to uremia    #PULMONARY    COVID 19 infection:  pt saturating well on RA  no indication for steroids or remdesivir  monitor oxygen sats    #CARDIOVASCULAR  no acute issues    #GASTROINTESTINAL    GI bleed:  pt p/w Hb 9.1  occult +  reported to have dark urine   keep NPO  c/w IVF  Protonix drip  trend CBC  GI consult in am     Transaminitis:  pt p/w ALT//455  CT abd pending  pt also has elevated lipase 560  RUQ sono if CT unable to be obtained  trend BMP    #RENAL    Acute renal failure:  pt p/w Cr 8.28, BUN 93, bicarb 8, pH 7.04  according to son, pt Cr was normal one month ago  follow UA, urine lytes  follow CT abd  pt does not want HD  nephro consulted Dr Coyle   started on bicarb drip  follow repeat BMP    Hyperkalemia:  K 6.3 on admission  s/p insulin, dextrose, lokelma, calcium gluconate  follow repeat BMP  repeat cocktail as needed  nephro on board Dr. Coyle     #INFECTIOUS DISEASE  Urine looks very dark-concern for infection given h/o UTI  started empirically on zosyn for concern of pyelo  follow urine and blood cultures     #ENDOCRINE  no acute issues     #HEME    Anemia:  p/w Hb 9.1   occult +   management as above  trend CBC q8h     #  PROPHYLAXIS  -DVT: SCD  -GI: protonix     DISPO: admit to ICU  CODE STATUS: DNR/DNI/no central line  81 year old female from home with HHA, with PMH of CVA, HTN, HLD, SBO and renal insufficiency in the past, later improved, chronic Perez? presents to the ED with c/o vomiting, diarrhea and cough x 2 days. In ED, K was 6.3, Cr 8.28, bicarb 8. Hb 9.0. Occult +. COVID +. Admitted to ICU for acute renal failure and metabolic acidosis.    ASSESSMENT:  -Acute renal failure  -Hyperkalemia  -Anion gap metabolic acidosis  -GI bleed  -COVID infection  -Transaminitis  -Anemia    PLAN:    #NEURO  pt is AAO X 2-at baseline mental status  a bit more lethargic likely due to uremia    #PULMONARY    COVID 19 infection:  pt saturating well on RA  no indication for steroids or remdesivir  monitor oxygen sats    #CARDIOVASCULAR  no acute issues    #GASTROINTESTINAL    GI bleed:  pt p/w Hb 9.1  occult +  reported to have dark stool  keep NPO  c/w IVF  Protonix drip, switch to PPI BID  trend CBC  GI consult in am if further workup within [facility]     Transaminitis:  pt p/w ALT//455  CT abd pending  pt also has elevated lipase 560  RUQ sono if CT unable to be obtained  trend BMP    #RENAL    Acute renal failure:  pt p/w Cr 8.28, BUN 93, bicarb 8, pH 7.04  according to son, pt Cr was normal one month ago  follow UA, urine lytes  follow CT abd  pt does not want HD  nephro consulted Dr Coyle   started on bicarb drip  follow repeat BMP    Hyperkalemia:  K 6.3 on admission  s/p insulin, dextrose, lokelma, calcium gluconate  Repeat 4.8  repeat cocktail as needed  nephro on board Dr. Coyle     #INFECTIOUS DISEASE  Urine looks very dark-concern for infection given h/o UTI  started empirically on zosyn for concern of pyelo  follow urine and blood cultures     #ENDOCRINE  no acute issues     #HEME    Anemia:  p/w Hb 9.1   9.1>8.4>  occult +   management as above  trend CBC q8h     #  PROPHYLAXIS  -DVT: SCD  -GI: protonix     DISPO: admit to ICU  CODE STATUS: DNR/DNI/no central line

## 2022-06-16 NOTE — PROGRESS NOTE ADULT - ASSESSMENT
LIAM:  Cause is unclear but given the fact that she has in indwelling cardoso, obstruction should be considered.  Also, dehydration and ATN are possible.  She stated that she does not want HD and her son concurs.  - Continue hydration with HCO3 IV.  - Follow up BMP.  - Imaging study of abdomen/Pelvis.  - UA.  - Will hold HD for now.    Hyperkalemia:  Due to LIAM and Metabolic Acidosis.  - Start HCO3 infusion.  - Follow up[ BMP.      Metabolic Acidosis:  - Continue HCO3 infusion.  - Follow up BMP    Plan of care discussed with the housestaff.    Sam Coyle MD  Nephrology

## 2022-06-16 NOTE — PATIENT PROFILE ADULT - FALL HARM RISK - HARM RISK INTERVENTIONS

## 2022-06-16 NOTE — CONSULT NOTE ADULT - PROBLEM SELECTOR RECOMMENDATION 4
GOC discussion as above. MOLST on file for DNR/DNI, no central lines/pressors. Continue conservative mgmt per son's wishes. Agreeable for home hospice referral upon dc.

## 2022-06-16 NOTE — PROGRESS NOTE ADULT - SUBJECTIVE AND OBJECTIVE BOX
MAXIMINO MASCORRO  81y  Patient is a 81y old  Female who presents with a chief complaint of renal failure (2022 11:45)    HPI:  Seen and examined. Admitted for LIAM on CKD complicated by Hyperkalemia and Metabolic Acidosis.  She has been refusing HD in the past and now as well.     HEALTH ISSUES - PROBLEM Dx:        MEDICATIONS  (STANDING):  chlorhexidine 2% Cloths 1 Application(s) Topical <User Schedule>  dextrose 5% 1000 milliLiter(s) (100 mL/Hr) IV Continuous <Continuous>  pantoprazole Infusion 8 mG/Hr (10 mL/Hr) IV Continuous <Continuous>  piperacillin/tazobactam IVPB.. 3.375 Gram(s) IV Intermittent every 12 hours    MEDICATIONS  (PRN):    Vital Signs Last 24 Hrs  T(C): 36.4 (2022 08:00), Max: 36.7 (15 Hossein 2022 19:27)  T(F): 97.6 (2022 08:00), Max: 98.1 (15 Hossein 2022 19:27)  HR: 66 (2022 12:00) (66 - 95)  BP: 115/57 (2022 09:00) (101/39 - 154/134)  BP(mean): 68 (2022 09:00) (54 - 139)  RR: 10 (2022 12:00) (10 - 19)  SpO2: 100% (2022 12:00) (97% - 100%)  Daily Height in cm: 162.56 (15 Hossein 2022 19:27)    Daily Weight in k.8 (2022 08:00)    PHYSICAL EXAM:  Constitutional:  He appears comfortable and not distressed. Not diaphoretic.    Neck:  The thyroid is normal. Trachea is midline.     Respiratory: The lungs are clear to auscultation. No dullness and expansion is normal.    Cardiovascular: S1 and S2 are normal. No mummurs, rubs or gallops are present.    Gastrointestinal: The abdomen is soft. No tenderness is present. No masses are present. Bowel sounds are normal.    Genitourinary: The bladder is not distended. No CVA tenderness is present.    Extremities: No edema is noted. No deformities are present.    Neurological: Cognition is normal. Tone, power and sensation are normal.     Skin: No lesions are seen  or palpated.    Lymph Nodes: No lymphadenopathy is present.                            8.4    7.29  )-----------( 147      ( 2022 03:42 )             28.0         139  |  110<H>  |  93<H>  ----------------------------<  91  4.8   |  10<LL>  |  8.15<H>    Ca    7.8<L>      2022 03:42  Phos  6.4       Mg     2.4         TPro  7.1  /  Alb  2.9<L>  /  TBili  0.5  /  DBili  x   /  AST  350<H>  /  ALT  399<H>  /  AlkPhos  149<H>      Urinalysis Basic - ( 2022 00:23 )    Color: Green / Appearance: Slightly Turbid / S.015 / pH: x  Gluc: x / Ketone: Negative  / Bili: Negative / Urobili: Negative   Blood: x / Protein: 100 / Nitrite: Negative   Leuk Esterase: Moderate / RBC: 5-10 /HPF / WBC 11-25 /HPF   Sq Epi: x / Non Sq Epi: Few /HPF / Bacteria: Moderate /HPF

## 2022-06-16 NOTE — PROGRESS NOTE ADULT - SUBJECTIVE AND OBJECTIVE BOX
INTERVAL HPI/OVERNIGHT EVENTS: ***    PRESSORS: [ ] YES [ ] NO    Antimicrobial:  piperacillin/tazobactam IVPB.. 3.375 Gram(s) IV Intermittent every 12 hours    Cardiovascular:    Pulmonary:    Hematalogic:    Other:  chlorhexidine 2% Cloths 1 Application(s) Topical <User Schedule>  dextrose 5% 1000 milliLiter(s) IV Continuous <Continuous>  pantoprazole Infusion 8 mG/Hr IV Continuous <Continuous>    chlorhexidine 2% Cloths 1 Application(s) Topical <User Schedule>  dextrose 5% 1000 milliLiter(s) IV Continuous <Continuous>  pantoprazole Infusion 8 mG/Hr IV Continuous <Continuous>  piperacillin/tazobactam IVPB.. 3.375 Gram(s) IV Intermittent every 12 hours    Drug Dosing Weight  Height (cm): 162.6 (15 Hossein 2022 19:27)  Weight (kg): 64.9 (15 Hossein 2022 19:27)  BMI (kg/m2): 24.5 (15 Hossein 2022 19:27)  BSA (m2): 1.7 (15 Hossein 2022 19:27)    CENTRAL LINE: [ ] YES [ ] NO  LOCATION:   DATE INSERTED:  REMOVE: [ ] YES [ ] NO  EXPLAIN:    BUCKNER: [ ] YES [ ] NO    DATE INSERTED:  REMOVE:  [ ] YES [ ] NO  EXPLAIN:    A-LINE:  [ ] YES [ ] NO  LOCATION:   DATE INSERTED:  REMOVE:  [ ] YES [ ] NO  EXPLAIN:    [facility] -reviewed admission note, no change since admission            06-15 @ 07:01  -  -16 @ 07:00  --------------------------------------------------------  IN: 687.5 mL / OUT: 60 mL / NET: 627.5 mL              ROS:    CONSTITUTIONAL: No fever, weight loss, or fatigue  EYES: No eye pain, visual disturbances, or discharge  ENT:  No difficulty hearing, tinnitus, vertigo; No sinus or throat pain  NECK: No pain or stiffness  RESPIRATORY: No cough, wheezing, chills or hemoptysis; No Shortness of Breath  CARDIOVASCULAR: No chest pain, palpitations, passing out, dizziness, or leg swelling  GASTROINTESTINAL: No abdominal or epigastric pain. No nausea, vomiting, or hematemesis; No diarrhea or constipation. No melena or hematochezia.  GENITOURINARY: No dysuria, frequency, hematuria, or incontinence  NEUROLOGICAL: No headaches, memory loss, loss of strength, numbness, or tremors  SKIN: No itching, burning, rashes, or lesions   LYMPH Nodes: No enlarged glands  ENDOCRINE: No heat or cold intolerance; No hair loss  MUSCULOSKELETAL: No joint pain or swelling; No muscle, back, No extremity pain  PSYCHIATRIC: No depression, anxiety, mood swings, or difficulty sleeping  HEME/LYMPH: No easy bruising, or bleeding gums  ALLERGY AND IMMUNOLOGIC: No hives or eczema    PHYSICAL EXAM:    GENERAL: NAD  HEAD:  Atraumatic, Normocephalic  EYES: conjunctiva and sclera clear  ENMT: No tonsillar erythema, exudates, or enlargement; Moist mucous membranes, Good dentition, [ ]No lesions  NECK: Supple, normal appearance, No JVD; Normal thyroid; Trachea midline  NERVOUS SYSTEM:  Alert & Oriented X3, Good concentration; Motor Strength 5/5 B/L upper and lower extremities; DTRs 2+ intact and symmetric  CHEST/LUNG: No chest deformity; Normal percussion bilaterally; No rales, rhonchi, wheezing   HEART: Regular rate and rhythm; No murmurs, rubs, or gallops  ABDOMEN: Soft, Nontender, Nondistended;Bowel sounds present  EXTREMITIES:  2+ Peripheral Pulses, No clubbing, cyanosis, or edema  LYMPH: No lymphadenopathy noted  SKIN: No rashes or lesions; Good capillary refill      LABS:  CBC Full  -  ( 2022 03:42 )  WBC Count : 7.29 K/uL  RBC Count : 3.17 M/uL  Hemoglobin : 8.4 g/dL  Hematocrit : 28.0 %  Platelet Count - Automated : 147 K/uL  Mean Cell Volume : 88.3 fl  Mean Cell Hemoglobin : 26.5 pg  Mean Cell Hemoglobin Concentration : 30.0 gm/dL  Auto Neutrophil # : x  Auto Lymphocyte # : x  Auto Monocyte # : x  Auto Eosinophil # : x  Auto Basophil # : x  Auto Neutrophil % : x  Auto Lymphocyte % : x  Auto Monocyte % : x  Auto Eosinophil % : x  Auto Basophil % : x    06-16    139  |  110<H>  |  93<H>  ----------------------------<  91  4.8   |  10<LL>  |  8.15<H>    Ca    7.8<L>      2022 03:42  Phos  6.4     06-16  Mg     2.4     -16    TPro  7.1  /  Alb  2.9<L>  /  TBili  0.5  /  DBili  x   /  AST  350<H>  /  ALT  399<H>  /  AlkPhos  149<H>  06-16    PT/INR - ( 2022 03:43 )   PT: 12.2 sec;   INR: 1.02 ratio         PTT - ( 2022 03:43 )  PTT:46.2 sec  Urinalysis Basic - ( 2022 00:23 )    Color: Green / Appearance: Slightly Turbid / S.015 / pH: x  Gluc: x / Ketone: Negative  / Bili: Negative / Urobili: Negative   Blood: x / Protein: 100 / Nitrite: Negative   Leuk Esterase: Moderate / RBC: 5-10 /HPF / WBC 11-25 /HPF   Sq Epi: x / Non Sq Epi: Few /HPF / Bacteria: Moderate /HPF          RADIOLOGY & ADDITIONAL STUDIES REVIEWED:  ***    [ ]GOALS OF CARE DISCUSSION WITH PATIENT/FAMILY/PROXY:    CRITICAL CARE TIME SPENT: 35 minutes INTERVAL HPI/OVERNIGHT EVENTS: Patient with LUQ and flank pain. Patient continues to deny HD    PRESSORS: [ ] YES [x ] NO    Antimicrobial:  piperacillin/tazobactam IVPB.. 3.375 Gram(s) IV Intermittent every 12 hours    Cardiovascular:    Pulmonary:    Hematalogic:    Other:  chlorhexidine 2% Cloths 1 Application(s) Topical <User Schedule>  dextrose 5% 1000 milliLiter(s) IV Continuous <Continuous>  pantoprazole Infusion 8 mG/Hr IV Continuous <Continuous>    chlorhexidine 2% Cloths 1 Application(s) Topical <User Schedule>  dextrose 5% 1000 milliLiter(s) IV Continuous <Continuous>  pantoprazole Infusion 8 mG/Hr IV Continuous <Continuous>  piperacillin/tazobactam IVPB.. 3.375 Gram(s) IV Intermittent every 12 hours    Drug Dosing Weight  Height (cm): 162.6 (15 Hossein 2022 19:27)  Weight (kg): 64.9 (15 Hossein 2022 19:27)  BMI (kg/m2): 24.5 (15 Hossein 2022 19:27)  BSA (m2): 1.7 (15 Hossein 2022 19:27)    CENTRAL LINE: [ ] YES [x ] NO  LOCATION:   DATE INSERTED:  REMOVE: [ ] YES [ ] NO  EXPLAIN:    BUCKNER: [x ] YES [ ] NO    DATE INSERTED:  REMOVE:  [ ] YES [ ] NO  EXPLAIN:    A-LINE:  [ ] YES [ x] NO  LOCATION:   DATE INSERTED:  REMOVE:  [ ] YES [ ] NO  EXPLAIN:    [facility] -reviewed admission note, no change since admission            06-15 @ 07:  -   @ 07:00  --------------------------------------------------------  IN: 687.5 mL / OUT: 60 mL / NET: 627.5 mL              ROS:    CONSTITUTIONAL: No fever, weight loss, or fatigue  EYES: No eye pain, visual disturbances, or discharge  ENT:  No difficulty hearing, tinnitus, vertigo; No sinus or throat pain  NECK: No pain or stiffness  RESPIRATORY: No cough, wheezing, chills or hemoptysis; No Shortness of Breath  CARDIOVASCULAR: No chest pain, palpitations, passing out, dizziness, or leg swelling  GASTROINTESTINAL: abdominal pain No nausea, vomiting, or hematemesis; No diarrhea or constipation. No melena or hematochezia.  GENITOURINARY: No dysuria, frequency, hematuria, or incontinence  NEUROLOGICAL: No headaches, memory loss, loss of strength, numbness, or tremors  SKIN: No itching, burning, rashes, or lesions   LYMPH Nodes: No enlarged glands  ENDOCRINE: No heat or cold intolerance; No hair loss  MUSCULOSKELETAL: No joint pain or swelling; No muscle, back, No extremity pain  PSYCHIATRIC: No depression, anxiety, mood swings, or difficulty sleeping  HEME/LYMPH: No easy bruising, or bleeding gums  ALLERGY AND IMMUNOLOGIC: No hives or eczema    PHYSICAL EXAM:    GENERAL: appears uncomfortable  HEAD:  Atraumatic, Normocephalic  EYES: conjunctiva and sclera clear  ENMT: No tonsillar erythema, exudates, or enlargement; Moist mucous membranes  NECK: Supple, normal appearance, No JVD; Normal thyroid; Trachea midline  NERVOUS SYSTEM:  Alert & Oriented X2-3  CHEST/LUNG: No chest deformity; Normal percussion bilaterally; No rales, rhonchi, wheezing   HEART: Regular rate and rhythm; No murmurs, rubs, or gallops  ABDOMEN: Soft, LUQ tenderness and flank pain, Nondistended;Bowel sounds present  EXTREMITIES:  2+ Peripheral Pulses, No clubbing, cyanosis, or edema  LYMPH: No lymphadenopathy noted  SKIN: No rashes or lesions; Good capillary refill      LABS:  CBC Full  -  ( 2022 03:42 )  WBC Count : 7.29 K/uL  RBC Count : 3.17 M/uL  Hemoglobin : 8.4 g/dL  Hematocrit : 28.0 %  Platelet Count - Automated : 147 K/uL  Mean Cell Volume : 88.3 fl  Mean Cell Hemoglobin : 26.5 pg  Mean Cell Hemoglobin Concentration : 30.0 gm/dL  Auto Neutrophil # : x  Auto Lymphocyte # : x  Auto Monocyte # : x  Auto Eosinophil # : x  Auto Basophil # : x  Auto Neutrophil % : x  Auto Lymphocyte % : x  Auto Monocyte % : x  Auto Eosinophil % : x  Auto Basophil % : x    06-16    139  |  110<H>  |  93<H>  ----------------------------<  91  4.8   |  10<LL>  |  8.15<H>    Ca    7.8<L>      2022 03:42  Phos  6.4     06-16  Mg     2.4     -16    TPro  7.1  /  Alb  2.9<L>  /  TBili  0.5  /  DBili  x   /  AST  350<H>  /  ALT  399<H>  /  AlkPhos  149<H>  06-16    PT/INR - ( 2022 03:43 )   PT: 12.2 sec;   INR: 1.02 ratio         PTT - ( 2022 03:43 )  PTT:46.2 sec  Urinalysis Basic - ( 2022 00:23 )    Color: Green / Appearance: Slightly Turbid / S.015 / pH: x  Gluc: x / Ketone: Negative  / Bili: Negative / Urobili: Negative   Blood: x / Protein: 100 / Nitrite: Negative   Leuk Esterase: Moderate / RBC: 5-10 /HPF / WBC 11-25 /HPF   Sq Epi: x / Non Sq Epi: Few /HPF / Bacteria: Moderate /HPF          RADIOLOGY & ADDITIONAL STUDIES REVIEWED:  ***    [ ]GOALS OF CARE DISCUSSION WITH PATIENT/FAMILY/PROXY:    CRITICAL CARE TIME SPENT: 35 minutes

## 2022-06-16 NOTE — CONSULT NOTE ADULT - PROBLEM SELECTOR RECOMMENDATION 9
LIAM on CKD, with metab acidosis, on bicarb gtt. Has refused HD in the past and presently, previously on home hospice, discharged 6 mo ago. on antibiotics for UTI, f/u cx. DNR/DNI, no central lines, continue conservative mgmt per son's wishes.

## 2022-06-17 DIAGNOSIS — N17.9 ACUTE KIDNEY FAILURE, UNSPECIFIED: ICD-10-CM

## 2022-06-17 DIAGNOSIS — R53.81 OTHER MALAISE: ICD-10-CM

## 2022-06-17 DIAGNOSIS — U07.1 COVID-19: ICD-10-CM

## 2022-06-17 DIAGNOSIS — Z51.5 ENCOUNTER FOR PALLIATIVE CARE: ICD-10-CM

## 2022-06-17 LAB
ANION GAP SERPL CALC-SCNC: 17 MMOL/L — SIGNIFICANT CHANGE UP (ref 5–17)
BUN SERPL-MCNC: 93 MG/DL — HIGH (ref 7–18)
CALCIUM SERPL-MCNC: 6.4 MG/DL — CRITICAL LOW (ref 8.4–10.5)
CHLORIDE SERPL-SCNC: 103 MMOL/L — SIGNIFICANT CHANGE UP (ref 96–108)
CO2 SERPL-SCNC: 22 MMOL/L — SIGNIFICANT CHANGE UP (ref 22–31)
CREAT SERPL-MCNC: 8.39 MG/DL — HIGH (ref 0.5–1.3)
EGFR: 4 ML/MIN/1.73M2 — LOW
GLUCOSE SERPL-MCNC: 116 MG/DL — HIGH (ref 70–99)
HCT VFR BLD CALC: 22.5 % — LOW (ref 34.5–45)
HGB BLD-MCNC: 7.5 G/DL — LOW (ref 11.5–15.5)
MAGNESIUM SERPL-MCNC: 1.9 MG/DL — SIGNIFICANT CHANGE UP (ref 1.6–2.6)
MCHC RBC-ENTMCNC: 27.1 PG — SIGNIFICANT CHANGE UP (ref 27–34)
MCHC RBC-ENTMCNC: 33.3 GM/DL — SIGNIFICANT CHANGE UP (ref 32–36)
MCV RBC AUTO: 81.2 FL — SIGNIFICANT CHANGE UP (ref 80–100)
NRBC # BLD: 0 /100 WBCS — SIGNIFICANT CHANGE UP (ref 0–0)
PHOSPHATE SERPL-MCNC: 6.1 MG/DL — HIGH (ref 2.5–4.5)
PLATELET # BLD AUTO: 112 K/UL — LOW (ref 150–400)
POTASSIUM SERPL-MCNC: 3.8 MMOL/L — SIGNIFICANT CHANGE UP (ref 3.5–5.3)
POTASSIUM SERPL-SCNC: 3.8 MMOL/L — SIGNIFICANT CHANGE UP (ref 3.5–5.3)
RBC # BLD: 2.77 M/UL — LOW (ref 3.8–5.2)
RBC # FLD: 15.5 % — HIGH (ref 10.3–14.5)
SODIUM SERPL-SCNC: 142 MMOL/L — SIGNIFICANT CHANGE UP (ref 135–145)
WBC # BLD: 5.72 K/UL — SIGNIFICANT CHANGE UP (ref 3.8–10.5)
WBC # FLD AUTO: 5.72 K/UL — SIGNIFICANT CHANGE UP (ref 3.8–10.5)

## 2022-06-17 RX ORDER — PANTOPRAZOLE SODIUM 20 MG/1
40 TABLET, DELAYED RELEASE ORAL
Refills: 0 | Status: DISCONTINUED | OUTPATIENT
Start: 2022-06-17 | End: 2022-06-28

## 2022-06-17 RX ADMIN — Medication 650 MILLIGRAM(S): at 05:01

## 2022-06-17 RX ADMIN — CHLORHEXIDINE GLUCONATE 1 APPLICATION(S): 213 SOLUTION TOPICAL at 05:02

## 2022-06-17 RX ADMIN — PANTOPRAZOLE SODIUM 10 MG/HR: 20 TABLET, DELAYED RELEASE ORAL at 00:53

## 2022-06-17 RX ADMIN — PIPERACILLIN AND TAZOBACTAM 25 GRAM(S): 4; .5 INJECTION, POWDER, LYOPHILIZED, FOR SOLUTION INTRAVENOUS at 17:35

## 2022-06-17 RX ADMIN — Medication 650 MILLIGRAM(S): at 06:20

## 2022-06-17 RX ADMIN — PANTOPRAZOLE SODIUM 40 MILLIGRAM(S): 20 TABLET, DELAYED RELEASE ORAL at 17:35

## 2022-06-17 RX ADMIN — PIPERACILLIN AND TAZOBACTAM 25 GRAM(S): 4; .5 INJECTION, POWDER, LYOPHILIZED, FOR SOLUTION INTRAVENOUS at 05:02

## 2022-06-17 RX ADMIN — SODIUM CHLORIDE 100 MILLILITER(S): 9 INJECTION, SOLUTION INTRAVENOUS at 00:53

## 2022-06-17 NOTE — PROGRESS NOTE ADULT - SUBJECTIVE AND OBJECTIVE BOX
MAXIMINO MASCORRO  81y  Patient is a 81y old  Female who presents with a chief complaint of renal failure (2022 15:48)    HPI:  Seen and examined. No new complaints.  Followed for LIAM on CKD.    HEALTH ISSUES - PROBLEM Dx:        MEDICATIONS  (STANDING):  chlorhexidine 2% Cloths 1 Application(s) Topical <User Schedule>  dextrose 5% 1000 milliLiter(s) (100 mL/Hr) IV Continuous <Continuous>  pantoprazole  Injectable 40 milliGRAM(s) IV Push two times a day  piperacillin/tazobactam IVPB.. 3.375 Gram(s) IV Intermittent every 12 hours    MEDICATIONS  (PRN):  acetaminophen     Tablet .. 650 milliGRAM(s) Oral every 6 hours PRN Mild Pain (1 - 3)    Vital Signs Last 24 Hrs  T(C): 36.1 (2022 05:00), Max: 36.4 (2022 00:00)  T(F): 97 (2022 05:00), Max: 97.6 (2022 00:00)  HR: 81 (2022 11:00) (66 - 94)  BP: 129/53 (2022 11:00) (85/39 - 141/121)  BP(mean): 72 (2022 11:00) (38 - 126)  RR: 16 (2022 11:00) (10 - 21)  SpO2: 100% (2022 11:00) (97% - 100%)  Daily     Daily Weight in k.9 (2022 07:00)    PHYSICAL EXAM:  Constitutional:  He appears comfortable and not distressed. Not diaphoretic.    Neck:  The thyroid is normal. Trachea is midline.     Respiratory: The lungs are clear to auscultation. No dullness and expansion is normal.    Cardiovascular: S1 and S2 are normal. No mummurs, rubs or gallops are present.    Gastrointestinal: The abdomen is soft. No tenderness is present. No masses are present. Bowel sounds are normal.    Genitourinary: The bladder is not distended. No CVA tenderness is present.    Extremities: No edema is noted. No deformities are present.    Neurological: Cognition is normal. Tone, power and sensation are normal.     Skin: No lesions are seen  or palpated.    Lymph Nodes: No lymphadenopathy is present.                            7.5    5.72  )-----------( 112      ( 2022 05:10 )             22.5         142  |  103  |  93<H>  ----------------------------<  116<H>  3.8   |  22  |  8.39<H>    Ca    6.4<LL>      2022 05:10  Phos  6.1       Mg     1.9         TPro  7.1  /  Alb  2.9<L>  /  TBili  0.5  /  DBili  x   /  AST  350<H>  /  ALT  399<H>  /  AlkPhos  149<H>        Urinalysis Basic - ( 2022 00:23 )    Color: Green / Appearance: Slightly Turbid / S.015 / pH: x  Gluc: x / Ketone: Negative  / Bili: Negative / Urobili: Negative   Blood: x / Protein: 100 / Nitrite: Negative   Leuk Esterase: Moderate / RBC: 5-10 /HPF / WBC 11-25 /HPF   Sq Epi: x / Non Sq Epi: Few /HPF / Bacteria: Moderate /HPF

## 2022-06-17 NOTE — PROGRESS NOTE ADULT - ASSESSMENT
81 year old female from home with HHA, with PMH of CVA, HTN, HLD, SBO and renal insufficiency in the past, later improved, chronic Perez? presents to the ED with c/o vomiting, diarrhea and cough x 2 days. In ED, K was 6.3, Cr 8.28, bicarb 8. Hb 9.0. Occult +. COVID +. Admitted to ICU for acute renal failure and metabolic acidosis.    ASSESSMENT:  -Acute renal failure  -Hyperkalemia  -Anion gap metabolic acidosis  -GI bleed  -COVID infection  -Transaminitis  -Anemia    PLAN:    #NEURO  pt is AAO X 2-at baseline mental status  a bit more lethargic likely due to uremia    #PULMONARY    COVID 19 infection:  pt saturating well on RA  no indication for steroids or remdesivir  monitor oxygen sats    #CARDIOVASCULAR  no acute issues    #GASTROINTESTINAL    GI bleed:  pt p/w Hb 9.1  occult +  reported to have dark stool  keep NPO  c/w IVF  Protonix drip, switch to PPI BID  trend CBC  GI consult in am if further workup within GOC     Transaminitis:  pt p/w ALT//455  CT abd pending  pt also has elevated lipase 560  RUQ sono if CT unable to be obtained  trend BMP    #RENAL    Acute renal failure:  pt p/w Cr 8.28, BUN 93, bicarb 8, pH 7.04  according to son, pt Cr was normal one month ago  follow UA, urine lytes  follow CT abd  pt does not want HD  nephro consulted Dr Coyle   started on bicarb drip  follow repeat BMP    Hyperkalemia:  K 6.3 on admission  s/p insulin, dextrose, lokelma, calcium gluconate  Repeat 4.8  repeat cocktail as needed  nephro on board Dr. Coyle     #INFECTIOUS DISEASE  Urine looks very dark-concern for infection given h/o UTI  started empirically on zosyn for concern of pyelo  follow urine and blood cultures     #ENDOCRINE  no acute issues     #HEME    Anemia:  p/w Hb 9.1   9.1>8.4>  occult +   management as above  trend CBC q8h     #  PROPHYLAXIS  -DVT: SCD  -GI: protonix     DISPO: admit to ICU  CODE STATUS: DNR/DNI/no central line  81 year old female from home with HHA, with PMH of CVA, HTN, HLD, SBO and renal insufficiency in the past, later improved, chronic Perez? presents to the ED with c/o vomiting, diarrhea and cough x 2 days. In ED, K was 6.3, Cr 8.28, bicarb 8. Hb 9.0. Occult +. COVID +. Admitted to ICU for acute renal failure and metabolic acidosis.    ASSESSMENT:  -Acute renal failure  -Hyperkalemia  -Anion gap metabolic acidosis  -GI bleed  -COVID infection  -Transaminitis  -Anemia    PLAN:    #NEURO  pt is AAO X 2-at baseline mental status  a bit more lethargic likely due to uremia    #PULMONARY    COVID 19 infection:  pt saturating well on RA  no indication for steroids or remdesivir  monitor oxygen sats    #CARDIOVASCULAR  no acute issues    #GASTROINTESTINAL    GI bleed:  pt p/w Hb 9.1  occult +  reported to have dark stool  keep NPO  c/w IVF  c/w PPI BID  Hgb 9.1>8.4>7.5  Patient and family currently do not want to process with colonoscopy     Transaminitis:  pt p/w ALT//455  CT abd pending  pt also has elevated lipase 560  RUQ sono if CT unable to be obtained  trend BMP    #RENAL    Acute renal failure:  pt p/w Cr 8.28, BUN 93, bicarb 8, pH 7.04  according to son, pt Cr was normal one month ago?  follow UA, urine lytes  follow CT abd  pt does not want HD  nephro consulted Dr Coyle   started on bicarb drip  follow repeat BMP    Hyperkalemia:  K 6.3 on admission  s/p insulin, dextrose, lokelma, calcium gluconate  Repeat 4.8  repeat cocktail as needed  nephro on board Dr. Coyle   Normalized    #INFECTIOUS DISEASE  Urine looks very dark-concern for infection given h/o UTI  started empirically on zosyn for concern of pyelo  urine culture growing gram negative sky    #ENDOCRINE  no acute issues     #HEME    Anemia:  p/w Hb 9.1   9.1>8.4>  occult +   management as above  trend CBC q8h     #  PROPHYLAXIS  -DVT: SCD  -GI: protonix     DISPO: admit to ICU  CODE STATUS: DNR/DNI/no central line

## 2022-06-17 NOTE — CHART NOTE - NSCHARTNOTEFT_GEN_A_CORE
Patient is an 81 year old female from home with HHA, with PMHx of CVA, HTN, HLD, SBO and renal insufficiency in the past which improved, and chronic Perez who presented to the ED with c/o vomiting. As per son Reid ([phone]), patient has been having vomiting x 2 days- non-bloody and white in color. She also has been having diarrhea x 2 days, cough with phlegm but no fever. He also reports her urine being much darker but very little in amount. Patient had ESRD in the past and was offered HD but refused and was discharged home with home hospice. However, she improved and was taken off hospice. Patient remains DNR/ DNI/ no hemodialysis or central line. She was found to be COVID positive with transaminitis but was saturating well on room air; no indication for steroids/ no remdes due to renal failure. She also presented with elevated sr cr to 8 and hyperkalemia to k 6.3, bicarb 8, and pH 7.04. She was started on bicarb ggt, and nephrology Dr. Coyle was consulted. On admission, there was also concern for GI Bleed due to Hb 9.1, occult positive dark stool. She was kept NPO and started on protonoix ggt which was transitioned to BID. She was also started on zosyn for concern for pyelonephritis due to flank pain and positive UA. Renal function otherwise stabilized, patient taken off bicarb ggt with last bicarb of 22, and is otherwise stable for downgrade to medical floor.     ASSESSMENT:  -Acute renal failure  -Hyperkalemia  -Anion gap metabolic acidosis  -GI bleed  -COVID infection  -Transaminitis  -Anemia    To follow   follow sensitivities of Proteus in urine, titrate zosyn to Po Abx accordingly  Monitor for further drops in Hb, hold pharmacologic DVT ppx   F/u CT abdomen pelvis non con to r/o other intraabdominal source of sepsis Patient is an 81 year old female from home with HHA, with PMHx of CVA, HTN, HLD, SBO and renal insufficiency in the past which improved, and chronic Perez who presented to the ED with c/o vomiting. As per son Reid ([phone]), patient has been having vomiting x 2 days- non-bloody and white in color. She also has been having diarrhea x 2 days, cough with phlegm but no fever. He also reports her urine being much darker but very little in amount. Patient had ESRD in the past and was offered HD but refused and was discharged home with home hospice. However, she improved and was taken off hospice. Patient remains DNR/ DNI/ no hemodialysis or central line. She was found to be COVID positive with transaminitis but was saturating well on room air; no indication for steroids/ no remdes due to renal failure. She also presented with elevated sr cr to 8 and hyperkalemia to k 6.3, bicarb 8, and pH 7.04. She was started on bicarb ggt, and nephrology Dr. Coyle was consulted. On admission, there was also concern for GI Bleed due to Hb 9.1, occult positive dark stool. Family does not want to proceed with colonoscopy and wants conservative measures. She was kept NPO and started on protonoix ggt which was transitioned to BID. She was also started on zosyn for concern for pyelonephritis due to flank pain and positive UA. Renal function otherwise stabilized, patient taken off bicarb ggt with last bicarb of 22, and is otherwise stable for downgrade to medical floor.     ASSESSMENT:  -Acute renal failure  -Hyperkalemia  -Anion gap metabolic acidosis  -GI bleed  -COVID infection  -Transaminitis  -Anemia    To follow   No blood draws, no central line, No pressors, no blood as per family   follow sensitivities of Proteus in urine, titrate zosyn to Po Abx accordingly  Monitor for further drops in Hb, hold pharmacologic DVT ppx   F/u CT abdomen pelvis non con to r/o other intraabdominal source of sepsis Patient is an 81 year old female from home with HHA, with PMHx of CVA, HTN, HLD, SBO and renal insufficiency in the past which improved, and chronic Perez who presented to the ED with c/o vomiting. As per son Reid ([phone]), patient has been having vomiting x 2 days- non-bloody and white in color. She also has been having diarrhea x 2 days, cough with phlegm but no fever. He also reports her urine being much darker but very little in amount. Patient had ESRD in the past and was offered HD but refused and was discharged home with home hospice. However, she improved and was taken off hospice. Patient remains DNR/ DNI/ no hemodialysis or central line. She was found to be COVID positive with transaminitis but was saturating well on room air; no indication for steroids/ no remdes due to renal failure. She also presented with elevated sr cr to 8 and hyperkalemia to k 6.3, bicarb 8, and pH 7.04. She was started on bicarb ggt, and nephrology Dr. Coyle was consulted. On admission, there was also concern for GI Bleed due to Hb 9.1, occult positive dark stool. Family does not want to proceed with colonoscopy and wants conservative measures. She was kept NPO and started on protonoix ggt which was transitioned to BID. She was also started on zosyn for concern for pyelonephritis due to flank pain and positive UA. Renal function otherwise stabilized, patient taken off bicarb ggt with last bicarb of 22, and is otherwise stable for downgrade to medical floor.     ASSESSMENT:  -Acute renal failure  -Hyperkalemia  -Anion gap metabolic acidosis  -GI bleed  -COVID infection  -Transaminitis  -Anemia    To follow   minimal blood draws, no central line, No pressors, no blood as per family; palliative following for hospice/ comfort care discussion  follow sensitivities of Proteus in urine, titrate zosyn to Po Abx accordingly  Monitor for further drops in Hb, hold pharmacologic DVT ppx   F/u CT abdomen pelvis non con to r/o other intraabdominal source of sepsis

## 2022-06-17 NOTE — PROGRESS NOTE ADULT - ASSESSMENT
LIAM:  Cause is unclear but given the fact that she has in indwelling cardoso, obstruction should be considered.  Also, dehydration and ATN are possible.  She stated that she does not want HD and her son concurs.  - Continue hydration with HCO3 IV.  - Follow up BMP.  - Imaging study of abdomen/Pelvis.  - UA.  - Will hold HD for now.    Hyperkalemia:  Due to LIAM and Metabolic Acidosis.  - Follow up BMP.      Metabolic Acidosis:  Improved now.  - Follow up BMP.  - Hold HCO3 infusion.    Plan of care discussed with the housestaff.    Sam Coyle MD  Nephrology

## 2022-06-17 NOTE — PROGRESS NOTE ADULT - SUBJECTIVE AND OBJECTIVE BOX
INTERVAL HPI/OVERNIGHT EVENTS: ***    PRESSORS: [ ] YES [ ] NO    Antimicrobial:  piperacillin/tazobactam IVPB.. 3.375 Gram(s) IV Intermittent every 12 hours    Cardiovascular:    Pulmonary:    Hematalogic:    Other:  acetaminophen     Tablet .. 650 milliGRAM(s) Oral every 6 hours PRN  chlorhexidine 2% Cloths 1 Application(s) Topical <User Schedule>  dextrose 5% 1000 milliLiter(s) IV Continuous <Continuous>  pantoprazole  Injectable 40 milliGRAM(s) IV Push two times a day    acetaminophen     Tablet .. 650 milliGRAM(s) Oral every 6 hours PRN  chlorhexidine 2% Cloths 1 Application(s) Topical <User Schedule>  dextrose 5% 1000 milliLiter(s) IV Continuous <Continuous>  pantoprazole  Injectable 40 milliGRAM(s) IV Push two times a day  piperacillin/tazobactam IVPB.. 3.375 Gram(s) IV Intermittent every 12 hours    Drug Dosing Weight  Height (cm): 162.6 (2022 02:55)  Weight (kg): 58.8 (2022 02:55)  BMI (kg/m2): 22.2 (2022 02:55)  BSA (m2): 1.63 (2022 02:55)    CENTRAL LINE: [ ] YES [ ] NO  LOCATION:   DATE INSERTED:  REMOVE: [ ] YES [ ] NO  EXPLAIN:    SITA: [ ] YES [ ] NO    DATE INSERTED:  REMOVE:  [ ] YES [ ] NO  EXPLAIN:    A-LINE:  [ ] YES [ ] NO  LOCATION:   DATE INSERTED:  REMOVE:  [ ] YES [ ] NO  EXPLAIN:    [facility] -reviewed admission note, no change since admission             @ 07:  -   @ 07:00  --------------------------------------------------------  IN: 2530 mL / OUT: 840 mL / NET: 1690 mL              ROS:    CONSTITUTIONAL: No fever, weight loss, or fatigue  EYES: No eye pain, visual disturbances, or discharge  ENT:  No difficulty hearing, tinnitus, vertigo; No sinus or throat pain  NECK: No pain or stiffness  RESPIRATORY: No cough, wheezing, chills or hemoptysis; No Shortness of Breath  CARDIOVASCULAR: No chest pain, palpitations, passing out, dizziness, or leg swelling  GASTROINTESTINAL: No abdominal or epigastric pain. No nausea, vomiting, or hematemesis; No diarrhea or constipation. No melena or hematochezia.  GENITOURINARY: No dysuria, frequency, hematuria, or incontinence  NEUROLOGICAL: No headaches, memory loss, loss of strength, numbness, or tremors  SKIN: No itching, burning, rashes, or lesions   LYMPH Nodes: No enlarged glands  ENDOCRINE: No heat or cold intolerance; No hair loss  MUSCULOSKELETAL: No joint pain or swelling; No muscle, back, No extremity pain  PSYCHIATRIC: No depression, anxiety, mood swings, or difficulty sleeping  HEME/LYMPH: No easy bruising, or bleeding gums  ALLERGY AND IMMUNOLOGIC: No hives or eczema    PHYSICAL EXAM:    GENERAL: NAD  HEAD:  Atraumatic, Normocephalic  EYES: conjunctiva and sclera clear  ENMT: No tonsillar erythema, exudates, or enlargement; Moist mucous membranes, Good dentition, [ ]No lesions  NECK: Supple, normal appearance, No JVD; Normal thyroid; Trachea midline  NERVOUS SYSTEM:  Alert & Oriented X3, Good concentration; Motor Strength 5/5 B/L upper and lower extremities; DTRs 2+ intact and symmetric  CHEST/LUNG: No chest deformity; Normal percussion bilaterally; No rales, rhonchi, wheezing   HEART: Regular rate and rhythm; No murmurs, rubs, or gallops  ABDOMEN: Soft, Nontender, Nondistended;Bowel sounds present  EXTREMITIES:  2+ Peripheral Pulses, No clubbing, cyanosis, or edema  LYMPH: No lymphadenopathy noted  SKIN: No rashes or lesions; Good capillary refill      LABS:  CBC Full  -  ( 2022 05:10 )  WBC Count : 5.72 K/uL  RBC Count : 2.77 M/uL  Hemoglobin : 7.5 g/dL  Hematocrit : 22.5 %  Platelet Count - Automated : 112 K/uL  Mean Cell Volume : 81.2 fl  Mean Cell Hemoglobin : 27.1 pg  Mean Cell Hemoglobin Concentration : 33.3 gm/dL  Auto Neutrophil # : x  Auto Lymphocyte # : x  Auto Monocyte # : x  Auto Eosinophil # : x  Auto Basophil # : x  Auto Neutrophil % : x  Auto Lymphocyte % : x  Auto Monocyte % : x  Auto Eosinophil % : x  Auto Basophil % : x    06-17    142  |  103  |  93<H>  ----------------------------<  116<H>  3.8   |  22  |  8.39<H>    Ca    6.4<LL>      2022 05:10  Phos  6.1       Mg     1.9         TPro  7.1  /  Alb  2.9<L>  /  TBili  0.5  /  DBili  x   /  AST  350<H>  /  ALT  399<H>  /  AlkPhos  149<H>      PT/INR - ( 2022 03:43 )   PT: 12.2 sec;   INR: 1.02 ratio         PTT - ( 2022 03:43 )  PTT:46.2 sec  Urinalysis Basic - ( 2022 00:23 )    Color: Green / Appearance: Slightly Turbid / S.015 / pH: x  Gluc: x / Ketone: Negative  / Bili: Negative / Urobili: Negative   Blood: x / Protein: 100 / Nitrite: Negative   Leuk Esterase: Moderate / RBC: 5-10 /HPF / WBC 11-25 /HPF   Sq Epi: x / Non Sq Epi: Few /HPF / Bacteria: Moderate /HPF      Culture Results:   >100,000 CFU/ml Proteus mirabilis ( @ 05:57)  Culture Results:   No growth to date. ( @ 03:38)  Culture Results:   No growth to date. ( @ 03:38)      RADIOLOGY & ADDITIONAL STUDIES REVIEWED:  ***    [ ]GOALS OF CARE DISCUSSION WITH PATIENT/FAMILY/PROXY:    CRITICAL CARE TIME SPENT: 35 minutes INTERVAL HPI/OVERNIGHT EVENTS: No acute events.    PRESSORS: [ ] YES [X ] NO    Antimicrobial:  piperacillin/tazobactam IVPB.. 3.375 Gram(s) IV Intermittent every 12 hours    Cardiovascular:    Pulmonary:    Hematalogic:    Other:  acetaminophen     Tablet .. 650 milliGRAM(s) Oral every 6 hours PRN  chlorhexidine 2% Cloths 1 Application(s) Topical <User Schedule>  dextrose 5% 1000 milliLiter(s) IV Continuous <Continuous>  pantoprazole  Injectable 40 milliGRAM(s) IV Push two times a day    acetaminophen     Tablet .. 650 milliGRAM(s) Oral every 6 hours PRN  chlorhexidine 2% Cloths 1 Application(s) Topical <User Schedule>  dextrose 5% 1000 milliLiter(s) IV Continuous <Continuous>  pantoprazole  Injectable 40 milliGRAM(s) IV Push two times a day  piperacillin/tazobactam IVPB.. 3.375 Gram(s) IV Intermittent every 12 hours    Drug Dosing Weight  Height (cm): 162.6 (2022 02:55)  Weight (kg): 58.8 (2022 02:55)  BMI (kg/m2): 22.2 (2022 02:55)  BSA (m2): 1.63 (2022 02:55)    CENTRAL LINE: [ ] YES [X ] NO   REMOVE: [ ] YES [ ] NO  EXPLAIN:    SITA: [ ] YES [X ] NO      REMOVE:  [ ] YES [ ] NO  EXPLAIN:    A-LINE:  [ ] YES [X ] NO   REMOVE:  [ ] YES [ ] NO  EXPLAIN:    [facility] -reviewed admission note, no change since admission             @ 07:01  -   @ 07:00  --------------------------------------------------------  IN: 2530 mL / OUT: 840 mL / NET: 1690 mL              ROS:    CONSTITUTIONAL: No fever, weight loss, or fatigue  EYES: No eye pain, visual disturbances, or discharge  ENT:  No difficulty hearing, tinnitus, vertigo; No sinus or throat pain  NECK: No pain or stiffness  RESPIRATORY: No cough, wheezing, chills or hemoptysis; No Shortness of Breath  CARDIOVASCULAR: No chest pain, palpitations, passing out, dizziness, or leg swelling  GASTROINTESTINAL: L flank pain, No nausea, vomiting, or hematemesis; No diarrhea or constipation. No melena or hematochezia.  GENITOURINARY: No dysuria, frequency, hematuria, or incontinence  NEUROLOGICAL: No headaches, memory loss, loss of strength, numbness, or tremors  SKIN: No itching, burning, rashes, or lesions   LYMPH Nodes: No enlarged glands  ENDOCRINE: No heat or cold intolerance; No hair loss  MUSCULOSKELETAL: No joint pain or swelling; No muscle, back, No extremity pain  PSYCHIATRIC: No depression, anxiety, mood swings, or difficulty sleeping  HEME/LYMPH: No easy bruising, or bleeding gums  ALLERGY AND IMMUNOLOGIC: No hives or eczema    PHYSICAL EXAM:    GENERAL: NAD  HEAD:  Atraumatic, Normocephalic  EYES: conjunctiva and sclera clear  ENMT: No tonsillar erythema, exudates, or enlargement; Moist mucous membranes, Good dentition, [ ]No lesions  NECK: Supple, normal appearance, No JVD; Normal thyroid; Trachea midline  NERVOUS SYSTEM:  Alert & Oriented X3, Good concentration;   CHEST/LUNG: No chest deformity; Normal percussion bilaterally; No rales, rhonchi, wheezing   HEART: Regular rate and rhythm; No murmurs, rubs, or gallops  ABDOMEN: +L flank tenderness  EXTREMITIES:  2+ Peripheral Pulses, No clubbing, cyanosis, or edema  LYMPH: No lymphadenopathy noted  SKIN: No rashes or lesions; Good capillary refill      LABS:  CBC Full  -  ( 2022 05:10 )  WBC Count : 5.72 K/uL  RBC Count : 2.77 M/uL  Hemoglobin : 7.5 g/dL  Hematocrit : 22.5 %  Platelet Count - Automated : 112 K/uL  Mean Cell Volume : 81.2 fl  Mean Cell Hemoglobin : 27.1 pg  Mean Cell Hemoglobin Concentration : 33.3 gm/dL  Auto Neutrophil # : x  Auto Lymphocyte # : x  Auto Monocyte # : x  Auto Eosinophil # : x  Auto Basophil # : x  Auto Neutrophil % : x  Auto Lymphocyte % : x  Auto Monocyte % : x  Auto Eosinophil % : x  Auto Basophil % : x    -    142  |  103  |  93<H>  ----------------------------<  116<H>  3.8   |  22  |  8.39<H>    Ca    6.4<LL>      2022 05:10  Phos  6.1       Mg     1.9         TPro  7.1  /  Alb  2.9<L>  /  TBili  0.5  /  DBili  x   /  AST  350<H>  /  ALT  399<H>  /  AlkPhos  149<H>      PT/INR - ( 2022 03:43 )   PT: 12.2 sec;   INR: 1.02 ratio         PTT - ( 2022 03:43 )  PTT:46.2 sec  Urinalysis Basic - ( 2022 00:23 )    Color: Green / Appearance: Slightly Turbid / S.015 / pH: x  Gluc: x / Ketone: Negative  / Bili: Negative / Urobili: Negative   Blood: x / Protein: 100 / Nitrite: Negative   Leuk Esterase: Moderate / RBC: 5-10 /HPF / WBC 11-25 /HPF   Sq Epi: x / Non Sq Epi: Few /HPF / Bacteria: Moderate /HPF      Culture Results:   >100,000 CFU/ml Proteus mirabilis ( @ 05:57)  Culture Results:   No growth to date. ( @ 03:38)  Culture Results:   No growth to date. ( @ 03:38)      RADIOLOGY & ADDITIONAL STUDIES REVIEWED:  ***    [ ]GOALS OF CARE DISCUSSION WITH PATIENT/FAMILY/PROXY:    CRITICAL CARE TIME SPENT: 35 minutes

## 2022-06-17 NOTE — PROGRESS NOTE ADULT - ATTENDING COMMENTS
81 year old female from home with HHA, with PMH of CVA, HTN, HLD, SBO and renal insufficiency in the past, later improved, chronic Perez? presents to the ED with c/o vomiting, diarrhea and cough x 2 days. Found to have progression of renal failure with severe metabolic acidosis.    ASSESSMENT:  -Acute on chronic renal failure  -Hyperkalemia  -Anion gap metabolic acidosis  -GI bleed  -COVID infection  -Transaminitis  -Anemia    Plan:  - Patient on IV bicarb infusion  - Refusing further blood draws at this time  - cont. IV fluid hydration  - Nephrology follow up  - Has refused HD previously and family does not want HD   - PPI, change to IV BID  - Monitor for further bleeding  - Palliative care consultation  - airborne and contact isolation   - Transfer to medical service
81 year old female from home with HHA, with PMH of CVA, HTN, HLD, SBO and renal insufficiency in the past, later improved, chronic Perez? presents to the ED with c/o vomiting, diarrhea and cough x 2 days. Found to have progression of renal failure with severe metabolic acidosis.    ASSESSMENT:  -Acute on chronic renal failure  -Hyperkalemia  -Anion gap metabolic acidosis  -GI bleed  -COVID infection  -Transaminitis  -Anemia    Plan:  - Refusing further blood draws at this time  - cont. IV fluid hydration  - Nephrology follow up  - Has refused HD previously and family does not want HD   - PPI, change to IV BID  - Monitor for further bleeding  - Palliative care consultation noted   - Airborne and contact isolation

## 2022-06-18 DIAGNOSIS — Z71.89 OTHER SPECIFIED COUNSELING: ICD-10-CM

## 2022-06-18 DIAGNOSIS — U07.1 COVID-19: ICD-10-CM

## 2022-06-18 DIAGNOSIS — R74.01 ELEVATION OF LEVELS OF LIVER TRANSAMINASE LEVELS: ICD-10-CM

## 2022-06-18 DIAGNOSIS — N12 TUBULO-INTERSTITIAL NEPHRITIS, NOT SPECIFIED AS ACUTE OR CHRONIC: ICD-10-CM

## 2022-06-18 DIAGNOSIS — E43 UNSPECIFIED SEVERE PROTEIN-CALORIE MALNUTRITION: ICD-10-CM

## 2022-06-18 DIAGNOSIS — N18.9 CHRONIC KIDNEY DISEASE, UNSPECIFIED: ICD-10-CM

## 2022-06-18 DIAGNOSIS — N18.6 END STAGE RENAL DISEASE: ICD-10-CM

## 2022-06-18 DIAGNOSIS — K92.2 GASTROINTESTINAL HEMORRHAGE, UNSPECIFIED: ICD-10-CM

## 2022-06-18 LAB
-  AMIKACIN: SIGNIFICANT CHANGE UP
-  AMOXICILLIN/CLAVULANIC ACID: SIGNIFICANT CHANGE UP
-  AMPICILLIN/SULBACTAM: SIGNIFICANT CHANGE UP
-  AMPICILLIN: SIGNIFICANT CHANGE UP
-  AZTREONAM: SIGNIFICANT CHANGE UP
-  CEFAZOLIN: SIGNIFICANT CHANGE UP
-  CEFEPIME: SIGNIFICANT CHANGE UP
-  CEFTRIAXONE: SIGNIFICANT CHANGE UP
-  CIPROFLOXACIN: SIGNIFICANT CHANGE UP
-  ERTAPENEM: SIGNIFICANT CHANGE UP
-  GENTAMICIN: SIGNIFICANT CHANGE UP
-  LEVOFLOXACIN: SIGNIFICANT CHANGE UP
-  MEROPENEM: SIGNIFICANT CHANGE UP
-  NITROFURANTOIN: SIGNIFICANT CHANGE UP
-  PIPERACILLIN/TAZOBACTAM: SIGNIFICANT CHANGE UP
-  TOBRAMYCIN: SIGNIFICANT CHANGE UP
-  TRIMETHOPRIM/SULFAMETHOXAZOLE: SIGNIFICANT CHANGE UP
ANION GAP SERPL CALC-SCNC: 13 MMOL/L — SIGNIFICANT CHANGE UP (ref 5–17)
BUN SERPL-MCNC: 87 MG/DL — HIGH (ref 7–18)
CALCIUM SERPL-MCNC: 6 MG/DL — CRITICAL LOW (ref 8.4–10.5)
CHLORIDE SERPL-SCNC: 106 MMOL/L — SIGNIFICANT CHANGE UP (ref 96–108)
CO2 SERPL-SCNC: 25 MMOL/L — SIGNIFICANT CHANGE UP (ref 22–31)
CREAT SERPL-MCNC: 8.99 MG/DL — HIGH (ref 0.5–1.3)
CULTURE RESULTS: SIGNIFICANT CHANGE UP
EGFR: 4 ML/MIN/1.73M2 — LOW
GLUCOSE SERPL-MCNC: 73 MG/DL — SIGNIFICANT CHANGE UP (ref 70–99)
HCT VFR BLD CALC: 23.9 % — LOW (ref 34.5–45)
HGB BLD-MCNC: 7.8 G/DL — LOW (ref 11.5–15.5)
MCHC RBC-ENTMCNC: 26.7 PG — LOW (ref 27–34)
MCHC RBC-ENTMCNC: 32.6 GM/DL — SIGNIFICANT CHANGE UP (ref 32–36)
MCV RBC AUTO: 81.8 FL — SIGNIFICANT CHANGE UP (ref 80–100)
METHOD TYPE: SIGNIFICANT CHANGE UP
NRBC # BLD: 0 /100 WBCS — SIGNIFICANT CHANGE UP (ref 0–0)
ORGANISM # SPEC MICROSCOPIC CNT: SIGNIFICANT CHANGE UP
ORGANISM # SPEC MICROSCOPIC CNT: SIGNIFICANT CHANGE UP
PLATELET # BLD AUTO: 110 K/UL — LOW (ref 150–400)
POTASSIUM SERPL-MCNC: 3.3 MMOL/L — LOW (ref 3.5–5.3)
POTASSIUM SERPL-SCNC: 3.3 MMOL/L — LOW (ref 3.5–5.3)
RBC # BLD: 2.92 M/UL — LOW (ref 3.8–5.2)
RBC # FLD: 15.8 % — HIGH (ref 10.3–14.5)
SODIUM SERPL-SCNC: 144 MMOL/L — SIGNIFICANT CHANGE UP (ref 135–145)
SPECIMEN SOURCE: SIGNIFICANT CHANGE UP
WBC # BLD: 5.47 K/UL — SIGNIFICANT CHANGE UP (ref 3.8–10.5)
WBC # FLD AUTO: 5.47 K/UL — SIGNIFICANT CHANGE UP (ref 3.8–10.5)

## 2022-06-18 RX ORDER — MEROPENEM 1 G/30ML
500 INJECTION INTRAVENOUS EVERY 24 HOURS
Refills: 0 | Status: COMPLETED | OUTPATIENT
Start: 2022-06-18 | End: 2022-06-27

## 2022-06-18 RX ADMIN — MEROPENEM 100 MILLIGRAM(S): 1 INJECTION INTRAVENOUS at 17:23

## 2022-06-18 RX ADMIN — PIPERACILLIN AND TAZOBACTAM 25 GRAM(S): 4; .5 INJECTION, POWDER, LYOPHILIZED, FOR SOLUTION INTRAVENOUS at 06:52

## 2022-06-18 RX ADMIN — Medication 650 MILLIGRAM(S): at 11:48

## 2022-06-18 RX ADMIN — Medication 650 MILLIGRAM(S): at 11:18

## 2022-06-18 RX ADMIN — PANTOPRAZOLE SODIUM 40 MILLIGRAM(S): 20 TABLET, DELAYED RELEASE ORAL at 17:23

## 2022-06-18 RX ADMIN — PANTOPRAZOLE SODIUM 40 MILLIGRAM(S): 20 TABLET, DELAYED RELEASE ORAL at 06:53

## 2022-06-18 NOTE — PROGRESS NOTE ADULT - ASSESSMENT
81 year old female from home with HHA, with PMHx of CVA, HTN, HLD, SBO and renal insufficiency in the past which improved, and chronic Perez who presented to the ED with c/o vomiting. As per son Reid ([phone]), patient has been having vomiting x 2 days- non-bloody and white in color. She also has been having diarrhea x 2 days, cough with phlegm but no fever. He also reports her urine being much darker but very little in amount. Patient had ESRD in the past and was offered HD but refused and was discharged home with home hospice. However, she improved and was taken off hospice. Patient remains DNR/ DNI/ no hemodialysis or central line. She was found to be COVID positive with transaminitis but was saturating well on room air; no indication for steroids/ no remdesivir due to renal failure. She also presented with elevated sr cr to 8 and hyperkalemia to k 6.3, bicarb 8, and pH 7.04. She was started on bicarb ggt, and nephrology Dr. Coyle was consulted. On admission, there was also concern for GI Bleed due to Hb 9.1, occult positive dark stool. Family does not want to proceed with colonoscopy and wants conservative measures. She was kept NPO and started on protonoix ggt which was transitioned to BID. She was also started on zosyn for concern for pyelonephritis due to flank pain and positive UA. Renal function otherwise stabilized, patient taken off bicarb ggt with last bicarb of 22, and is otherwise stable for downgrade to medical floor.   6/17 Transferred to [facility]  6/18 Diet advanced to clears.

## 2022-06-18 NOTE — PROGRESS NOTE ADULT - PROBLEM SELECTOR PLAN 2
has chronic cardoso   UC growing proteus mirabilis   afebrile, no leukocytosis  D/c Zosyn .Change abx to Meropenem  ID Dr. Schaefer consulted.

## 2022-06-18 NOTE — DIETITIAN INITIAL EVALUATION ADULT - PERTINENT LABORATORY DATA
06-18    144  |  106  |  87<H>  ----------------------------<  73  3.3<L>   |  25  |  8.99<H>    Ca    6.0<LL>      18 Jun 2022 06:59  Phos  6.1     06-17  Mg     1.9     06-17

## 2022-06-18 NOTE — PROGRESS NOTE ADULT - PROBLEM SELECTOR PLAN 6
with elevated lipase  of unknown etiology  Tbili WNL  LFT's downtrending  Avoid hepatotoxic agents  DNR/DNI /conservative management

## 2022-06-18 NOTE — CHART NOTE - NSCHARTNOTEFT_GEN_A_CORE
Contacted pt's emegency contact/son Catrachoab at [phone]  and updated on pt's clinical condition and plan of care.   Son requests facetime with pt. D/w staff.   All questions and concerns addressed.     Chanell Zarate NP Medicine /[facility]   [phone]

## 2022-06-18 NOTE — PROGRESS NOTE ADULT - SUBJECTIVE AND OBJECTIVE BOX
MAXIMINO MASCORRO    SCU progress note    INTERVAL HPI/OVERNIGHT EVENTS: ***    DNR [ ]   DNI  [  ]    Covid - 19 PCR: COVID-19 PCR: Detected (15 Hossein 2022 21:07)      The 4Ms    What Matters Most: see GOC  Age appropriate Medications/Screen for High Risk Medication: Yes  Mentation: see CAM below  Mobility: defer to physical exam    The Confusion Assessment Method (CAM) Diagnostic Algorithm     1: Acute Onset or Fluctuating Course  - Is there evidence of an acute change in mental status from the patient’s baseline? Did the (abnormal) behavior  fluctuate during the day, that is, tend to come and go, or increase and decrease in severity?  [ ] YES [ ] NO     2: Inattention  - Did the patient have difficulty focusing attention, being easily distractible, or having difficulty keeping track of what was being said?  [ ] YES [ ] NO     3: Disorganized thinking  -Was the patient’s thinking disorganized or incoherent, such as rambling or irrelevant conversation, unclear or illogical flow of ideas, or unpredictable switching from subject to subject?  [ ] YES [ ] NO    4: Altered Level of consciousness?  [ ] YES [ ] NO    The diagnosis of delirium by CAM requires the presence of features 1 and 2 and either 3 or 4.    PRESSORS: [ ] YES [ ] NO  piperacillin/tazobactam IVPB.. 3.375 Gram(s) IV Intermittent every 12 hours    Cardiovascular:  Heart Failure  Acute   Acute on Chronic  Chronic         Pulmonary:    Hematalogic:    Other:  acetaminophen     Tablet .. 650 milliGRAM(s) Oral every 6 hours PRN  pantoprazole  Injectable 40 milliGRAM(s) IV Push two times a day    acetaminophen     Tablet .. 650 milliGRAM(s) Oral every 6 hours PRN  pantoprazole  Injectable 40 milliGRAM(s) IV Push two times a day  piperacillin/tazobactam IVPB.. 3.375 Gram(s) IV Intermittent every 12 hours    Drug Dosing Weight  Height (cm): 162.6 (16 Jun 2022 02:55)  Weight (kg): 58.8 (16 Jun 2022 02:55)  BMI (kg/m2): 22.2 (16 Jun 2022 02:55)  BSA (m2): 1.63 (16 Jun 2022 02:55)    CENTRAL LINE: [ ] YES [ ] NO  LOCATION:   DATE INSERTED:  REMOVE: [ ] YES [ ] NO  EXPLAIN:    BUCKNER: [ ] YES [ ] NO    DATE INSERTED:  REMOVE:  [ ] YES [ ] NO  EXPLAIN:    PAST MEDICAL & SURGICAL HISTORY:  HTN (hypertension)      CVA (cerebrovascular accident)      CRI (chronic renal insufficiency)      SBO (small bowel obstruction)      No significant past surgical history                  06-17 @ 07:01  -  06-18 @ 07:00  --------------------------------------------------------  IN: 920 mL / OUT: 1545 mL / NET: -625 mL            PHYSICAL EXAM:        LABS:  CBC Full  -  ( 18 Jun 2022 06:59 )  WBC Count : 5.47 K/uL  RBC Count : 2.92 M/uL  Hemoglobin : 7.8 g/dL  Hematocrit : 23.9 %  Platelet Count - Automated : 110 K/uL  Mean Cell Volume : 81.8 fl  Mean Cell Hemoglobin : 26.7 pg  Mean Cell Hemoglobin Concentration : 32.6 gm/dL  Auto Neutrophil # : x  Auto Lymphocyte # : x  Auto Monocyte # : x  Auto Eosinophil # : x  Auto Basophil # : x  Auto Neutrophil % : x  Auto Lymphocyte % : x  Auto Monocyte % : x  Auto Eosinophil % : x  Auto Basophil % : x    06-18    144  |  106  |  87<H>  ----------------------------<  73  3.3<L>   |  25  |  8.99<H>    Ca    6.0<LL>      18 Jun 2022 06:59  Phos  6.1     06-17  Mg     1.9     06-17                [  ]  DVT Prophylaxis  [  ]   Abnormal Nutritional Status -  Malnutrition   Cachexia      Morbid Obesity BMI >/=40  Diet, NPO:   Except Medications (06-16-22 @ 00:50) [Active]          RADIOLOGY & ADDITIONAL STUDIES:  ***    Goals of Care Discussion with Family/Proxy/Other   - see note from/family meeting set up for...     MAXIMINO MASCORRO    SCU progress note    INTERVAL HPI/OVERNIGHT EVENTS: *** ICU downgrade to SCU  overnight     DNR [ ]   DNI  [  ]    Covid - 19 PCR: COVID-19 PCR: Detected (15 Hossein 2022 21:07)      The 4Ms    What Matters Most: see GOC  Age appropriate Medications/Screen for High Risk Medication: Yes  Mentation: see CAM below  Mobility: defer to physical exam    The Confusion Assessment Method (CAM) Diagnostic Algorithm     1: Acute Onset or Fluctuating Course  - Is there evidence of an acute change in mental status from the patient’s baseline? Did the (abnormal) behavior  fluctuate during the day, that is, tend to come and go, or increase and decrease in severity?  [ ] YES [ ] NO     2: Inattention  - Did the patient have difficulty focusing attention, being easily distractible, or having difficulty keeping track of what was being said?  [ ] YES [ ] NO     3: Disorganized thinking  -Was the patient’s thinking disorganized or incoherent, such as rambling or irrelevant conversation, unclear or illogical flow of ideas, or unpredictable switching from subject to subject?  [ ] YES [ ] NO    4: Altered Level of consciousness?  [ ] YES [ ] NO    The diagnosis of delirium by CAM requires the presence of features 1 and 2 and either 3 or 4.    PRESSORS: [ ] YES [ ] NO  piperacillin/tazobactam IVPB.. 3.375 Gram(s) IV Intermittent every 12 hours    Cardiovascular:  Heart Failure  Acute   Acute on Chronic  Chronic         Pulmonary:    Hematalogic:    Other:  acetaminophen     Tablet .. 650 milliGRAM(s) Oral every 6 hours PRN  pantoprazole  Injectable 40 milliGRAM(s) IV Push two times a day    acetaminophen     Tablet .. 650 milliGRAM(s) Oral every 6 hours PRN  pantoprazole  Injectable 40 milliGRAM(s) IV Push two times a day  piperacillin/tazobactam IVPB.. 3.375 Gram(s) IV Intermittent every 12 hours    Drug Dosing Weight  Height (cm): 162.6 (16 Jun 2022 02:55)  Weight (kg): 58.8 (16 Jun 2022 02:55)  BMI (kg/m2): 22.2 (16 Jun 2022 02:55)  BSA (m2): 1.63 (16 Jun 2022 02:55)    CENTRAL LINE: [ ] YES [ ] NO  LOCATION:   DATE INSERTED:  REMOVE: [ ] YES [ ] NO  EXPLAIN:    BUCKNER: [ ] YES [ ] NO    DATE INSERTED:  REMOVE:  [ ] YES [ ] NO  EXPLAIN:    PAST MEDICAL & SURGICAL HISTORY:  HTN (hypertension)      CVA (cerebrovascular accident)      CRI (chronic renal insufficiency)      SBO (small bowel obstruction)      No significant past surgical history                  06-17 @ 07:01  -  06-18 @ 07:00  --------------------------------------------------------  IN: 920 mL / OUT: 1545 mL / NET: -625 mL            PHYSICAL EXAM:        LABS:  CBC Full  -  ( 18 Jun 2022 06:59 )  WBC Count : 5.47 K/uL  RBC Count : 2.92 M/uL  Hemoglobin : 7.8 g/dL  Hematocrit : 23.9 %  Platelet Count - Automated : 110 K/uL  Mean Cell Volume : 81.8 fl  Mean Cell Hemoglobin : 26.7 pg  Mean Cell Hemoglobin Concentration : 32.6 gm/dL  Auto Neutrophil # : x  Auto Lymphocyte # : x  Auto Monocyte # : x  Auto Eosinophil # : x  Auto Basophil # : x  Auto Neutrophil % : x  Auto Lymphocyte % : x  Auto Monocyte % : x  Auto Eosinophil % : x  Auto Basophil % : x    06-18    144  |  106  |  87<H>  ----------------------------<  73  3.3<L>   |  25  |  8.99<H>    Ca    6.0<LL>      18 Jun 2022 06:59  Phos  6.1     06-17  Mg     1.9     06-17                [  ]  DVT Prophylaxis  [  ]   Abnormal Nutritional Status -  Malnutrition   Cachexia      Morbid Obesity BMI >/=40  Diet, NPO:   Except Medications (06-16-22 @ 00:50) [Active]          RADIOLOGY & ADDITIONAL STUDIES:  ***    Goals of Care Discussion with Family/Proxy/Other   - see note from/family meeting set up for...     MAXIMINO MASCORRO    SCU progress note    INTERVAL HPI/OVERNIGHT EVENTS: *** ICU downgrade to SCU  overnight   HPI : Patient is an 81 year old female from home with HHA, with PMHx of CVA, HTN, HLD, SBO and renal insufficiency in the past which improved, and chronic Buckner who presented to the ED with c/o vomiting. As per son Reid ([phone]), patient has been having vomiting x 2 days- non-bloody and white in color. She also has been having diarrhea x 2 days, cough with phlegm but no fever. He also reports her urine being much darker but very little in amount. Patient had ESRD in the past and was offered HD but refused and was discharged home with home hospice. However, she improved and was taken off hospice. Patient remains DNR/ DNI/ no hemodialysis or central line. She was found to be COVID positive with transaminitis but was saturating well on room air; no indication for steroids/ no remdesivir  due to renal failure. She also presented with elevated sr cr to 8 and hyperkalemia to k 6.3, bicarb 8, and pH 7.04. She was started on bicarb ggt, and nephrology Dr. Coyle was consulted. On admission, there was also concern for GI Bleed due to Hb 9.1, occult positive dark stool. Family does not want to proceed with colonoscopy and wants conservative measures. She was kept NPO and started on protonoix ggt which was transitioned to BID. She was also started on zosyn for concern for pyelonephritis due to flank pain and positive UA. Renal function otherwise stabilized, patient taken off bicarb ggt with last bicarb of 22, and is otherwise stable for downgrade to medical floor.       DNR [x ]   DNI  [ x ]    Covid - 19 PCR: COVID-19 PCR: Detected (15 Hossein 2022 21:07)      The 4Ms    What Matters Most: see GOC  Age appropriate Medications/Screen for High Risk Medication: Yes  Mentation: see CAM below  Mobility: defer to physical exam    The Confusion Assessment Method (CAM) Diagnostic Algorithm     1: Acute Onset or Fluctuating Course  - Is there evidence of an acute change in mental status from the patient’s baseline? Did the (abnormal) behavior  fluctuate during the day, that is, tend to come and go, or increase and decrease in severity?  [ ] YES [x) NO     2: Inattention  - Did the patient have difficulty focusing attention, being easily distractible, or having difficulty keeping track of what was being said?  [ ] YES [x ] NO     3: Disorganized thinking  -Was the patient’s thinking disorganized or incoherent, such as rambling or irrelevant conversation, unclear or illogical flow of ideas, or unpredictable switching from subject to subject?  [ ] YES [ x] NO    4: Altered Level of consciousness?  [ ] YES [ x] NO    The diagnosis of delirium by CAM requires the presence of features 1 and 2 and either 3 or 4.    PRESSORS: [ ] YES [ x] NO  piperacillin/tazobactam IVPB.. 3.375 Gram(s) IV Intermittent every 12 hours    Cardiovascular:  Heart Failure  Acute   Acute on Chronic  Chronic         Pulmonary:    Hematalogic:    Other:  acetaminophen     Tablet .. 650 milliGRAM(s) Oral every 6 hours PRN  pantoprazole  Injectable 40 milliGRAM(s) IV Push two times a day    acetaminophen     Tablet .. 650 milliGRAM(s) Oral every 6 hours PRN  pantoprazole  Injectable 40 milliGRAM(s) IV Push two times a day  piperacillin/tazobactam IVPB.. 3.375 Gram(s) IV Intermittent every 12 hours    Drug Dosing Weight  Height (cm): 162.6 (16 Jun 2022 02:55)  Weight (kg): 58.8 (16 Jun 2022 02:55)  BMI (kg/m2): 22.2 (16 Jun 2022 02:55)  BSA (m2): 1.63 (16 Jun 2022 02:55)    CENTRAL LINE: [ ] YES [x ] NO  LOCATION:   DATE INSERTED:  REMOVE: [ ] YES [ ] NO  EXPLAIN:    BUCKNER: [ x] YES [ ] NO    DATE INSERTED:6/16/22  REMOVE:  [ ] YES [x ] NO  EXPLAIN: chronic buckner    PAST MEDICAL & SURGICAL HISTORY:  HTN (hypertension)  CVA (cerebrovascular accident)  CRI (chronic renal insufficiency)  SBO (small bowel obstruction)  No significant past surgical history      06-17 @ 07:01  -  06-18 @ 07:00  --------------------------------------------------------  IN: 920 mL / OUT: 1545 mL / NET: -625 mL      PHYSICAL EXAM:  GENERAL: NAD  HEAD:  Atraumatic, Normocephalic  EYES: EOMI, PERRLA, conjunctiva and sclera clear  ENMT: No tonsillar erythema, exudates  NECK: Supple, No JVD, Normal thyroid  NERVOUS SYSTEM:Awake/ Alert & Oriented X2, Good concentration; Motor Strength 5/5 B/L upper and lower extremities;  CHEST/LUNG: Clear upper airway, decreased at bases bilaterally; No rales, rhonchi, wheezing, or rubs  HEART: Regular rate and rhythm; Grade 1-2/6  murmurs, loudest at left 2nd ICS.   ABDOMEN: Soft, Nondistended; Bowel sounds present. Tender at left flank.   EXTREMITIES:  2+ Peripheral Pulses, No clubbing, cyanosis, or edema  LYMPH: No lymphadenopathy noted  SKIN: No rashes or lesions        LABS:  CBC Full  -  ( 18 Jun 2022 06:59 )  WBC Count : 5.47 K/uL  RBC Count : 2.92 M/uL  Hemoglobin : 7.8 g/dL  Hematocrit : 23.9 %  Platelet Count - Automated : 110 K/uL  Mean Cell Volume : 81.8 fl  Mean Cell Hemoglobin : 26.7 pg  Mean Cell Hemoglobin Concentration : 32.6 gm/dL  Auto Neutrophil # : x  Auto Lymphocyte # : x  Auto Monocyte # : x  Auto Eosinophil # : x  Auto Basophil # : x  Auto Neutrophil % : x  Auto Lymphocyte % : x  Auto Monocyte % : x  Auto Eosinophil % : x  Auto Basophil % : x    06-18    144  |  106  |  87<H>  ----------------------------<  73  3.3<L>   |  25  |  8.99<H>    Ca    6.0<LL>      18 Jun 2022 06:59  Phos  6.1     06-17  Mg     1.9     06-17        [  ]  DVT Prophylaxis  [  ]   Abnormal Nutritional Status -  Malnutrition   Cachexia       Diet, NPO:   Except Medications (06-16-22 @ 00:50) [Active]      RADIOLOGY & ADDITIONAL STUDIES:  ***  < from: Xray Chest 1 View- PORTABLE-Urgent (06.15.22 @ 21:01) >  INTERPRETATION:  AP semierect chest on Pinky 15, 2022 at 8:49 PM. Patient   has hematuria and weakness.    COMPARISON: None available.    Elevated diaphragms noted.    Heart magnified by technique.    Lungs are clear.    IMPRESSION: No acute finding.    < end of copied text >          Goals of Care Discussion with Family/Proxy/Other   - see note from 6/16/22

## 2022-06-18 NOTE — DIETITIAN INITIAL EVALUATION ADULT - OTHER INFO
Pt is On Airborne Isolation. Pt with Covid +. Pt observed Via Glass Door. Pt asleep. Pt is A x O x 2 Per Nsg.  Palliative Care notes Noted. Pt and Family Refusing Dialysis. Pt with ESRD.PT DG DG from ICU on 6/17. Labs Noted .H /H 7.8/23.9 NO blood Per Family. Palliative is following for Hospice/ Comfort care.  PT with Vomiting and Diarrhea x 2 day PTA.

## 2022-06-18 NOTE — DIETITIAN INITIAL EVALUATION ADULT - PERTINENT MEDS FT
MEDICATIONS  (STANDING):  pantoprazole  Injectable 40 milliGRAM(s) IV Push two times a day  piperacillin/tazobactam IVPB.. 3.375 Gram(s) IV Intermittent every 12 hours    MEDICATIONS  (PRN):  acetaminophen     Tablet .. 650 milliGRAM(s) Oral every 6 hours PRN Mild Pain (1 - 3)

## 2022-06-18 NOTE — PROVIDER CONTACT NOTE (CRITICAL VALUE NOTIFICATION) - TEST AND RESULT REPORTED:
urine culture collected on 6/16, final result 6/18 positive greater than [id number] proteus mirabilis esbl
Potassium 6.3/Carbon dioxide 8.0

## 2022-06-18 NOTE — PROGRESS NOTE ADULT - PROBLEM SELECTOR PLAN 3
No remedesivir due to ESRD and transaminitis.   Oxygenating adequately on RA, %  Monitor oxygenation  Supportive measures.   Airborne/contact isolation

## 2022-06-18 NOTE — PROGRESS NOTE ADULT - PROBLEM SELECTOR PLAN 1
p/w Cr 8.28, BUN 93, bicarb 8, pH 7.04  s/p bicarb drip  CO2 normalized  Pt does not want HD  DNR/DNI no invasive or aggressive measures, no central lines  Palliative following

## 2022-06-19 RX ADMIN — PANTOPRAZOLE SODIUM 40 MILLIGRAM(S): 20 TABLET, DELAYED RELEASE ORAL at 18:05

## 2022-06-19 RX ADMIN — MEROPENEM 100 MILLIGRAM(S): 1 INJECTION INTRAVENOUS at 18:05

## 2022-06-19 RX ADMIN — PANTOPRAZOLE SODIUM 40 MILLIGRAM(S): 20 TABLET, DELAYED RELEASE ORAL at 06:08

## 2022-06-19 NOTE — PROGRESS NOTE ADULT - SUBJECTIVE AND OBJECTIVE BOX
MAXIMINO MASCORRO    SCU progress note    INTERVAL HPI/OVERNIGHT EVENTS: ***No acute events overnight.     DNR [x ]   DNI  [x  ]    Covid - 19 PCR: COVID-19 PCR: Detected (15 Hossein 2022 21:07)      The 4Ms    What Matters Most: see GOC  Age appropriate Medications/Screen for High Risk Medication: Yes  Mentation: see CAM below  Mobility: defer to physical exam    The Confusion Assessment Method (CAM) Diagnostic Algorithm     1: Acute Onset or Fluctuating Course  - Is there evidence of an acute change in mental status from the patient’s baseline? Did the (abnormal) behavior  fluctuate during the day, that is, tend to come and go, or increase and decrease in severity?  [ ] YES [ x] NO     2: Inattention  - Did the patient have difficulty focusing attention, being easily distractible, or having difficulty keeping track of what was being said?  [ ] YES [x ] NO     3: Disorganized thinking  -Was the patient’s thinking disorganized or incoherent, such as rambling or irrelevant conversation, unclear or illogical flow of ideas, or unpredictable switching from subject to subject?  [ ] YES [ x] NO    4: Altered Level of consciousness?  [ ] YES [ x] NO    The diagnosis of delirium by CAM requires the presence of features 1 and 2 and either 3 or 4.    PRESSORS: [ ] YES [x ] NO  meropenem  IVPB 500 milliGRAM(s) IV Intermittent every 24 hours    Cardiovascular:    Pulmonary:    Hematalogic:    Other:  acetaminophen     Tablet .. 650 milliGRAM(s) Oral every 6 hours PRN  pantoprazole  Injectable 40 milliGRAM(s) IV Push two times a day    acetaminophen     Tablet .. 650 milliGRAM(s) Oral every 6 hours PRN  meropenem  IVPB 500 milliGRAM(s) IV Intermittent every 24 hours  pantoprazole  Injectable 40 milliGRAM(s) IV Push two times a day    Drug Dosing Weight  Height (cm): 162.6 (16 Jun 2022 02:55)  Weight (kg): 58.8 (16 Jun 2022 02:55)  BMI (kg/m2): 22.2 (16 Jun 2022 02:55)  BSA (m2): 1.63 (16 Jun 2022 02:55)    CENTRAL LINE: [ ] YES [x ] NO  LOCATION:   DATE INSERTED:  REMOVE: [ ] YES [ ] NO  EXPLAIN:    CARDOSO: [x ] YES [ ] NO    DATE INSERTED: 6/16/22  REMOVE:  [ ] YES [ x] NO  EXPLAIN: chronic cardoso    PAST MEDICAL & SURGICAL HISTORY:  HTN (hypertension)  CVA (cerebrovascular accident)  CRI (chronic renal insufficiency)  SBO (small bowel obstruction)  No significant past surgical history    06-18 @ 07:01  -  06-19 @ 07:00  --------------------------------------------------------  IN: 0 mL / OUT: 1450 mL / NET: -1450 mL    PHYSICAL EXAM:  GENERAL: NAD, well-groomed, well-developed  HEAD:  Atraumatic, Normocephalic  EYES: EOMI, PERRLA, conjunctiva and sclera clear  ENMT: No tonsillar erythema, exudates, or enlargement; Moist mucous membranes, Good dentition  NECK: Supple, No JVD, Normal thyroid  NERVOUS SYSTEM:  Alert & Oriented X2-3, Good concentration; Motor Strength 5/5 B/L upper and lower extremities  CHEST/LUNG: Clear  to percussion bilaterally; No rales, rhonchi, wheezing, or rubs  HEART: Regular rate and rhythm; Grade 1-2/6  murmurs, loudest at left 2nd ICS.    ABDOMEN: Soft, Nontender, Nondistended; Bowel sounds present  EXTREMITIES:  2+ Peripheral Pulses, No clubbing, cyanosis, or edema  LYMPH: No lymphadenopathy noted  SKIN: No rashes or lesions        LABS:  CBC Full  -  ( 18 Jun 2022 06:59 )  WBC Count : 5.47 K/uL  RBC Count : 2.92 M/uL  Hemoglobin : 7.8 g/dL  Hematocrit : 23.9 %  Platelet Count - Automated : 110 K/uL  Mean Cell Volume : 81.8 fl  Mean Cell Hemoglobin : 26.7 pg  Mean Cell Hemoglobin Concentration : 32.6 gm/dL  Auto Neutrophil # : x  Auto Lymphocyte # : x  Auto Monocyte # : x  Auto Eosinophil # : x  Auto Basophil # : x  Auto Neutrophil % : x  Auto Lymphocyte % : x  Auto Monocyte % : x  Auto Eosinophil % : x  Auto Basophil % : x    06-18    144  |  106  |  87<H>  ----------------------------<  73  3.3<L>   |  25  |  8.99<H>    Ca    6.0<LL>      18 Jun 2022 06:59      [  ]  DVT Prophylaxis  [  ]   Abnormal Nutritional Status -  Malnutrition   Cachexia       Diet, Clear Liquid (06-18-22 @ 10:52) [Active]    RADIOLOGY & ADDITIONAL STUDIES:  ***    < from: Xray Chest 1 View- PORTABLE-Urgent (06.15.22 @ 21:01) >  PROCEDURE DATE:  06/15/2022          INTERPRETATION:  AP semierect chest on Pinky 15, 2022 at 8:49 PM. Patient   has hematuria and weakness.    COMPARISON: None available.    Elevated diaphragms noted.    Heart magnified by technique.    Lungs are clear.    IMPRESSION: No acute finding.    < end of copied text >      Goals of Care Discussion with Family/Proxy/Other   - see note from 6/16/22   MAXIMINO MASCORRO    SCU progress note    INTERVAL HPI/OVERNIGHT EVENTS: ***No acute events overnight.   Pt seen and examined this morning.   Pt awake, endorses feeling better, "not much pain here (touches left abdomen),", no N/V, had  BM this morning.   Noted pt drinking large amts of fluids. Will advance diet to soft with 1 L fluid restriction/day to avoid fluid overload in setting of ESRD not on dialysis. D/w pt.   DNR [x ]   DNI  [x  ]    Covid - 19 PCR: COVID-19 PCR: Detected (15 Hossein 2022 21:07)      The 4Ms    What Matters Most: see GOC  Age appropriate Medications/Screen for High Risk Medication: Yes  Mentation: see CAM below  Mobility: defer to physical exam    The Confusion Assessment Method (CAM) Diagnostic Algorithm     1: Acute Onset or Fluctuating Course  - Is there evidence of an acute change in mental status from the patient’s baseline? Did the (abnormal) behavior  fluctuate during the day, that is, tend to come and go, or increase and decrease in severity?  [ ] YES [ x] NO     2: Inattention  - Did the patient have difficulty focusing attention, being easily distractible, or having difficulty keeping track of what was being said?  [ ] YES [x ] NO     3: Disorganized thinking  -Was the patient’s thinking disorganized or incoherent, such as rambling or irrelevant conversation, unclear or illogical flow of ideas, or unpredictable switching from subject to subject?  [ ] YES [ x] NO    4: Altered Level of consciousness?  [ ] YES [ x] NO    The diagnosis of delirium by CAM requires the presence of features 1 and 2 and either 3 or 4.    PRESSORS: [ ] YES [x ] NO  meropenem  IVPB 500 milliGRAM(s) IV Intermittent every 24 hours    Cardiovascular:    Pulmonary:    Hematalogic:    Other:  acetaminophen     Tablet .. 650 milliGRAM(s) Oral every 6 hours PRN  pantoprazole  Injectable 40 milliGRAM(s) IV Push two times a day    acetaminophen     Tablet .. 650 milliGRAM(s) Oral every 6 hours PRN  meropenem  IVPB 500 milliGRAM(s) IV Intermittent every 24 hours  pantoprazole  Injectable 40 milliGRAM(s) IV Push two times a day    Drug Dosing Weight  Height (cm): 162.6 (16 Jun 2022 02:55)  Weight (kg): 58.8 (16 Jun 2022 02:55)  BMI (kg/m2): 22.2 (16 Jun 2022 02:55)  BSA (m2): 1.63 (16 Jun 2022 02:55)    CENTRAL LINE: [ ] YES [x ] NO  LOCATION:   DATE INSERTED:  REMOVE: [ ] YES [ ] NO  EXPLAIN:    CARDOSO: [x ] YES [ ] NO    DATE INSERTED: 6/16/22  REMOVE:  [ ] YES [ x] NO  EXPLAIN: chronic cardoso    PAST MEDICAL & SURGICAL HISTORY:  HTN (hypertension)  CVA (cerebrovascular accident)  CRI (chronic renal insufficiency)  SBO (small bowel obstruction)  No significant past surgical history    06-18 @ 07:01  -  06-19 @ 07:00  --------------------------------------------------------  IN: 0 mL / OUT: 1450 mL / NET: -1450 mL    PHYSICAL EXAM:  GENERAL: NAD, well-groomed, well-developed  HEAD:  Atraumatic, Normocephalic  EYES: EOMI, PERRLA, conjunctiva and sclera clear  ENMT: No tonsillar erythema, exudates, or enlargement; Moist mucous membranes, Good dentition  NECK: Supple, No JVD, Normal thyroid  NERVOUS SYSTEM:  Alert & Oriented X2-3, Good concentration; Motor Strength 5/5 B/L upper and lower extremities  CHEST/LUNG: Clear  to percussion bilaterally; No rales, rhonchi, wheezing, or rubs  HEART: Regular rate and rhythm; Grade 1-2/6  murmurs, loudest at left 2nd ICS.    ABDOMEN: Soft, Nontender, Nondistended; Bowel sounds present  EXTREMITIES:  2+ Peripheral Pulses, No clubbing, cyanosis, or edema  LYMPH: No lymphadenopathy noted  SKIN: No rashes or lesions        LABS:  CBC Full  -  ( 18 Jun 2022 06:59 )  WBC Count : 5.47 K/uL  RBC Count : 2.92 M/uL  Hemoglobin : 7.8 g/dL  Hematocrit : 23.9 %  Platelet Count - Automated : 110 K/uL  Mean Cell Volume : 81.8 fl  Mean Cell Hemoglobin : 26.7 pg  Mean Cell Hemoglobin Concentration : 32.6 gm/dL  Auto Neutrophil # : x  Auto Lymphocyte # : x  Auto Monocyte # : x  Auto Eosinophil # : x  Auto Basophil # : x  Auto Neutrophil % : x  Auto Lymphocyte % : x  Auto Monocyte % : x  Auto Eosinophil % : x  Auto Basophil % : x    06-18    144  |  106  |  87<H>  ----------------------------<  73  3.3<L>   |  25  |  8.99<H>    Ca    6.0<LL>      18 Jun 2022 06:59      [  ]  DVT Prophylaxis  [  ]   Abnormal Nutritional Status -  Malnutrition   Cachexia       Diet, Clear Liquid (06-18-22 @ 10:52) [Active]    RADIOLOGY & ADDITIONAL STUDIES:  ***    < from: Xray Chest 1 View- PORTABLE-Urgent (06.15.22 @ 21:01) >  PROCEDURE DATE:  06/15/2022          INTERPRETATION:  AP semierect chest on Pinky 15, 2022 at 8:49 PM. Patient   has hematuria and weakness.    COMPARISON: None available.    Elevated diaphragms noted.    Heart magnified by technique.    Lungs are clear.    IMPRESSION: No acute finding.    < end of copied text >      Goals of Care Discussion with Family/Proxy/Other   - see note from 6/16/22

## 2022-06-19 NOTE — CONSULT NOTE ADULT - SUBJECTIVE AND OBJECTIVE BOX
HPI:  81 year old female from home with HHA, with PMH of CVA, HTN, HLD, SBO and renal insufficiency in the past, later improved, chronic Perez? presents to the ED with c/o vomiting. As per son Reid ([phone]), patient has been having vomiting x 2 days- non-bloody and white in color. She also has been having diarrhea x 2 days, cough with phlegm but no fever. He also reports her urine being much darker but very little in amount. Patient had ESRD in the past and was offered HD but refused and was discharged home with home hospice. However, she got better and was taken off hospice. Patient remains DNR/ DNI/ no hemodialysis or central line.  (15 Hossein 2022 23:02)      PAST MEDICAL & SURGICAL HISTORY:  HTN (hypertension)      CVA (cerebrovascular accident)      CRI (chronic renal insufficiency)      SBO (small bowel obstruction)      No significant past surgical history          No Known Allergies      Meds:  acetaminophen     Tablet .. 650 milliGRAM(s) Oral every 6 hours PRN  meropenem  IVPB 500 milliGRAM(s) IV Intermittent every 24 hours  pantoprazole  Injectable 40 milliGRAM(s) IV Push two times a day      SOCIAL HISTORY:  Smoker:  YES / NO        PACK YEARS:                         WHEN QUIT?  ETOH use:  YES / NO               FREQUENCY / QUANTITY:  Ilicit Drug use:  YES / NO  Occupation:  Assisted device use (Cane / Walker):  Live with:    FAMILY HISTORY:  No pertinent family history in first degree relatives        VITALS:  Vital Signs Last 24 Hrs  T(C): 37.6 (19 Jun 2022 13:12), Max: 37.6 (19 Jun 2022 13:12)  T(F): 99.6 (19 Jun 2022 13:12), Max: 99.6 (19 Jun 2022 13:12)  HR: 69 (19 Jun 2022 13:12) (69 - 77)  BP: 158/39 (19 Jun 2022 13:12) (153/56 - 161/62)  BP(mean): --  RR: 18 (19 Jun 2022 13:12) (15 - 18)  SpO2: 100% (19 Jun 2022 13:12) (99% - 100%)    LABS/DIAGNOSTIC TESTS:                          7.8    5.47  )-----------( 110      ( 18 Jun 2022 06:59 )             23.9     WBC Count: 5.47 K/uL (06-18 @ 06:59)  WBC Count: 5.72 K/uL (06-17 @ 05:10)      06-18    144  |  106  |  87<H>  ----------------------------<  73  3.3<L>   |  25  |  8.99<H>    Ca    6.0<LL>      18 Jun 2022 06:59                    LACTATE:    ABG -     CULTURES:   Clean Catch Clean Catch (Midstream)  06-16 @ 05:57   >100,000 CFU/ml Proteus mirabilis ESBL  --  Proteus mirabilis ESBL      .Blood Blood  06-16 @ 03:38   No growth to date.  --  --          RADIOLOGY:< from: Xray Chest 1 View- PORTABLE-Urgent (06.15.22 @ 21:01) >  ACC: [id number] EXAM:  XR CHEST PORTABLE URGENT 1V                          PROCEDURE DATE:  06/15/2022          INTERPRETATION:  AP semierect chest on Pinky 15, 2022 at 8:49 PM. Patient   has hematuria and weakness.    COMPARISON: None available.    Elevated diaphragms noted.    Heart magnified by technique.    Lungs are clear.    IMPRESSION: No acute finding.    --- End of Report ---            LUZMA HUBBARD MD; Attending Radiologist  This document has been electronically signed. Jun 16 2022 10:31AM    < end of copied text >        ROS  [  ] UNABLE TO ELICIT               HPI:  81 year old female from home with HHA, with PMH of CVA, HTN, HLD, SBO and renal insufficiency in the past, later improved, chronic Perez? presents to the ED with c/o vomiting. As per son Reid ([phone]), patient has been having vomiting x 2 days- non-bloody and white in color. She also has been having diarrhea x 2 days, cough with phlegm but no fever. He also reports her urine being much darker but very little in amount. Patient had ESRD in the past and was offered HD but refused and was discharged home with home hospice. However, she got better and was taken off hospice. Patient remains DNR/ DNI/ no hemodialysis or central line.  (15 Hossein 2022 23:02)      History as above, asked to see this patient from home with multiple medical problems including renal failure who refuses dialysis. She presented with nausea, vomiting and diarrhea along with some abdominal pain and a cough all for approx 2 days , she is denying any urinary symptoms but was found to have an ESBL proteus in her urine culture here hence asked to see patient. She has no fevers or chills but was found to be COVID + here. She is being treated with Meropenem here.        PAST MEDICAL & SURGICAL HISTORY:  HTN (hypertension)      CVA (cerebrovascular accident)      CRI (chronic renal insufficiency)      SBO (small bowel obstruction)      No significant past surgical history          No Known Allergies      Meds:  acetaminophen     Tablet .. 650 milliGRAM(s) Oral every 6 hours PRN  meropenem  IVPB 500 milliGRAM(s) IV Intermittent every 24 hours  pantoprazole  Injectable 40 milliGRAM(s) IV Push two times a day      SOCIAL HISTORY:  Smoker:  no  ETOH use:  no      FAMILY HISTORY:  No pertinent family history in first degree relatives        VITALS:  Vital Signs Last 24 Hrs  T(C): 37.6 (19 Jun 2022 13:12), Max: 37.6 (19 Jun 2022 13:12)  T(F): 99.6 (19 Jun 2022 13:12), Max: 99.6 (19 Jun 2022 13:12)  HR: 69 (19 Jun 2022 13:12) (69 - 77)  BP: 158/39 (19 Jun 2022 13:12) (153/56 - 161/62)  BP(mean): --  RR: 18 (19 Jun 2022 13:12) (15 - 18)  SpO2: 100% (19 Jun 2022 13:12) (99% - 100%)    LABS/DIAGNOSTIC TESTS:                          7.8    5.47  )-----------( 110      ( 18 Jun 2022 06:59 )             23.9     WBC Count: 5.47 K/uL (06-18 @ 06:59)  WBC Count: 5.72 K/uL (06-17 @ 05:10)      06-18    144  |  106  |  87<H>  ----------------------------<  73  3.3<L>   |  25  |  8.99<H>    Ca    6.0<LL>      18 Jun 2022 06:59                    LACTATE:    ABG -     CULTURES:   Clean Catch Clean Catch (Midstream)  06-16 @ 05:57   >100,000 CFU/ml Proteus mirabilis ESBL  --  Proteus mirabilis ESBL      .Blood Blood  06-16 @ 03:38   No growth to date.  --  --          RADIOLOGY:< from: Xray Chest 1 View- PORTABLE-Urgent (06.15.22 @ 21:01) >  ACC: [id number] EXAM:  XR CHEST PORTABLE URGENT 1V                          PROCEDURE DATE:  06/15/2022          INTERPRETATION:  AP semierect chest on Pinky 15, 2022 at 8:49 PM. Patient   has hematuria and weakness.    COMPARISON: None available.    Elevated diaphragms noted.    Heart magnified by technique.    Lungs are clear.    IMPRESSION: No acute finding.    --- End of Report ---            LUZMA HUBBARD MD; Attending Radiologist  This document has been electronically signed. Jun 16 2022 10:31AM    < end of copied text >        ROS  [  ] UNABLE TO ELICIT

## 2022-06-19 NOTE — PROGRESS NOTE ADULT - ASSESSMENT
81 year old female from home with HHA, with PMHx of CVA, HTN, HLD, SBO and renal insufficiency in the past which improved, and chronic Perez who presented to the ED with c/o vomiting. As per son Reid ([phone]), patient has been having vomiting x 2 days- non-bloody and white in color. She also has been having diarrhea x 2 days, cough with phlegm but no fever. He also reports her urine being much darker but very little in amount. Patient had ESRD in the past and was offered HD but refused and was discharged home with home hospice. However, she improved and was taken off hospice. Patient remains DNR/ DNI/ no hemodialysis or central line. She was found to be COVID positive with transaminitis but was saturating well on room air; no indication for steroids/ no remdesivir due to renal failure. She also presented with elevated sr cr to 8 and hyperkalemia to k 6.3, bicarb 8, and pH 7.04. She was started on bicarb ggt, and nephrology Dr. Coyle was consulted. On admission, there was also concern for GI Bleed due to Hb 9.1, occult positive dark stool. Family does not want to proceed with colonoscopy and wants conservative measures. She was kept NPO and started on protonoix ggt which was transitioned to BID. She was also started on zosyn for concern for pyelonephritis due to flank pain and positive UA. Renal function otherwise stabilized, patient taken off bicarb ggt with last bicarb of 22, and is otherwise stable for downgrade to medical floor.   6/17 Transferred to SCU  6/18 Diet advanced to clears.

## 2022-06-19 NOTE — PROGRESS NOTE ADULT - PROBLEM SELECTOR PLAN 1
p/w Cr 8.28, BUN 93, bicarb 8, pH 7.04  s/p bicarb drip  CO2 normalized  Pt does not want HD  DNR/DNI no invasive or aggressive measures, no central lines  Palliative following. p/w Cr 8.28, BUN 93, bicarb 8, pH 7.04  s/p bicarb drip  CO2 normalized  Pt does not want HD  DNR/DNI no invasive or aggressive measures, no central lines  Palliative following  Fluid restriction 1liter/day

## 2022-06-19 NOTE — PROGRESS NOTE ADULT - PROBLEM SELECTOR PLAN 2
has chronic cardoso   UC growing proteus mirabilis   afebrile, no leukocytosis  Continue Meropenem   F/u consult with ID Dr. Schaefer

## 2022-06-19 NOTE — PROGRESS NOTE ADULT - SUBJECTIVE AND OBJECTIVE BOX
MAXIMINO MASCORRO  81y  Patient is a 81y old  Female who presents with a chief complaint of renal failure (2022 10:14)    HPI:  Seen and examined. Feels well, no nausea and vomiting.    HEALTH ISSUES - PROBLEM Dx:  Acute renal failure    Debility    Palliative care encounter    COVID    GIB (gastrointestinal bleeding)    Anemia secondary to renal failure    Pyelonephritis    Transaminitis    Advance care planning    Severe protein-calorie malnutrition    ESRD (end stage renal disease)    2019 novel coronavirus disease (COVID-19)          MEDICATIONS  (STANDING):  meropenem  IVPB 500 milliGRAM(s) IV Intermittent every 24 hours  pantoprazole  Injectable 40 milliGRAM(s) IV Push two times a day    MEDICATIONS  (PRN):  acetaminophen     Tablet .. 650 milliGRAM(s) Oral every 6 hours PRN Mild Pain (1 - 3)    Vital Signs Last 24 Hrs  T(C): 36.8 (2022 05:09), Max: 36.8 (2022 05:09)  T(F): 98.3 (2022 05:09), Max: 98.3 (2022 05:09)  HR: 77 (2022 05:09) (73 - 79)  BP: 153/56 (2022 05:09) (153/56 - 165/57)  BP(mean): --  RR: 16 (2022 05:09) (15 - 16)  SpO2: 99% (2022 05:09) (99% - 100%)  Daily     Daily Weight in k.6 (2022 07:00)    PHYSICAL EXAM:  Constitutional:  She appears comfortable and not distressed. Not diaphoretic.    Neck:  The thyroid is normal. Trachea is midline.     Respiratory: The lungs are clear to auscultation. No dullness and expansion is normal.    Cardiovascular: S1 and S2 are normal. No mummurs, rubs or gallops are present.    Gastrointestinal: The abdomen is soft. No tenderness is present. No masses are present. Bowel sounds are normal.    Genitourinary: The bladder is not distended. No CVA tenderness is present. Perez with cloudy urine.    Extremities: No edema is noted. No deformities are present.    Neurological: Cognition is normal. Tone, power and sensation are normal. Gait is steady.    Skin: No leasions are seen  or palpated.    Lymph Nodes: No lymphadenopathy is present.    Psychiatric: Mood is appropriate. No hallucinations or flight of ideas are noted.                              7.8    5.47  )-----------( 110      ( 2022 06:59 )             23.9         144  |  106  |  87<H>  ----------------------------<  73  3.3<L>   |  25  |  8.99<H>    Ca    6.0<LL>      2022 06:59

## 2022-06-19 NOTE — PROGRESS NOTE ADULT - ASSESSMENT
LIAM:  Cause is unclear but given the fact that she has in indwelling cardoso, obstruction should be considered.  Also, dehydration and ATN are possible.  She stated that she does not want HD and her son concurs.  - Continue hydration with HCO3 IV.  - Follow up BMP.  - Imaging study of abdomen/Pelvis.  - Will hold HD for now.    Hyperkalemia:  Due to LIAM and Metabolic Acidosis.  - Follow up BMP.      Metabolic Acidosis:  Improved now.  - Follow up BMP.  - Hold HCO3 infusion.    Plan of care discussed with the housestaff.    Sam Coyle MD  Nephrology

## 2022-06-19 NOTE — PROGRESS NOTE ADULT - PROBLEM SELECTOR PLAN 5
Advance diet soft   Nutrition following Advance diet soft   Fluid restriction 1liter/day  Nutrition following

## 2022-06-19 NOTE — CONSULT NOTE ADULT - ASSESSMENT
LIAM:  Cause is unclear but given the fact that she has in indwelling cardoso, obstruction should be considered.  Also, dehydration and ATN are possible.  She stated that she does not want HD and her son concurs.  - Hydrate with HCO3 IV.  - Follow up BMP.  - Imaging study of abdomen/Pelvis.  - UA.  - Will hold HD for now.    Hyperkalemia:  Due to LIAM and Metabolic Acidosis.  - Start HCO3 infusion.  - Follow up[ BMP.  - Lokelma 10 gram PO.    Metabolic Acidosis:  - Start HCO3 infusion.    Plan of care discussed with the housestaff.    Sam Coyle MD  Nephrology  
UTI ( ESBL Proteus)    Plan - Cont Meropenem 500mgs iv q24hrs

## 2022-06-19 NOTE — PROGRESS NOTE ADULT - PROBLEM SELECTOR PLAN 9
Assist with all ADL's  Maintain safety measures.  Supportive measures.
Pt previously on home hospice with [facility] , has 24hour  HHA  Pt is DNR/DNI / no HD/ no invasive or aggressive measures, no central line.   Rashi Mathews is agreeable fo referral to home hospice again once medically stabilized.   Will hold off on further diagnostic studies / labs   Palliative following.

## 2022-06-19 NOTE — CONSULT NOTE ADULT - GASTROINTESTINAL
soft/nontender/nondistended/normal active bowel sounds/no guarding/no rigidity/no organomegaly/no masses palpable/tender details…

## 2022-06-19 NOTE — PROGRESS NOTE ADULT - PROBLEM SELECTOR PLAN 6
with elevated lipase  of unknown etiology  Tbili WNL  LFT's downtrending  Advance diet as tolerated.   Avoid hepatotoxic agents  DNR/DNI /conservative management.

## 2022-06-19 NOTE — CHART NOTE - NSCHARTNOTEFT_GEN_A_CORE
Contacted pt's emegency contact/ son Reid  and updated on pt's clinical condition and plan of care, diet advanced to regular with fluid restriction to avoid fluid overload especially in pt not on HD. Son expresses understanding and is grateful for update.   All questions and concerns addressed.     Chanell Zarate NP Medicine /SCU   [phone]

## 2022-06-20 RX ADMIN — MEROPENEM 100 MILLIGRAM(S): 1 INJECTION INTRAVENOUS at 17:18

## 2022-06-20 RX ADMIN — PANTOPRAZOLE SODIUM 40 MILLIGRAM(S): 20 TABLET, DELAYED RELEASE ORAL at 05:39

## 2022-06-20 RX ADMIN — PANTOPRAZOLE SODIUM 40 MILLIGRAM(S): 20 TABLET, DELAYED RELEASE ORAL at 17:14

## 2022-06-20 NOTE — PROGRESS NOTE ADULT - PROBLEM SELECTOR PLAN 5
Continue conservative management as per wishes.  Tolerating renal diet with 1liter per day fluid restriction.

## 2022-06-20 NOTE — PROGRESS NOTE ADULT - ASSESSMENT
LIAM:  Cause is unclear but given the fact that she has in indwelling cardoso, obstruction should be considered.  Also, dehydration and ATN are possible.  She stated that she does not want HD and her son concurs.  - Follow up BMP.  - Imaging study of abdomen/Pelvis.  - Will hold HD for now.    Hyperkalemia:  Due to LIAM and Metabolic Acidosis.  - Follow up BMP.      Metabolic Acidosis:  Improved now.  - Follow up BMP.  - Hold HCO3 infusion.    Plan of care discussed with the housestaff.    Sam Coyle MD  Nephrology

## 2022-06-20 NOTE — PROGRESS NOTE ADULT - ASSESSMENT
81 year old female from home with HHA, with PMHx of CVA, HTN, HLD, SBO and renal insufficiency in the past which improved, and chronic Perez who presented to the ED with c/o vomiting. As per son Reid ([phone]), patient has been having vomiting x 2 days- non-bloody and white in color. She also has been having diarrhea x 2 days, cough with phlegm but no fever. He also reports her urine being much darker but very little in amount. Patient had ESRD in the past and was offered HD but refused and was discharged home with home hospice. However, she improved and was taken off hospice. Patient remains DNR/ DNI/ no hemodialysis or central line. She was found to be COVID positive with transaminitis but was saturating well on room air; no indication for steroids/ no remdesivir due to renal failure. She also presented with elevated sr cr to 8 and hyperkalemia to k 6.3, bicarb 8, and pH 7.04. She was started on bicarb ggt, and nephrology Dr. Coyle was consulted. On admission, there was also concern for GI Bleed due to Hb 9.1, occult positive dark stool. Family does not want to proceed with colonoscopy and wants conservative measures. She was kept NPO and started on protonoix ggt which was transitioned to BID. She was also started on zosyn for concern for pyelonephritis due to flank pain and positive UA. Renal function otherwise stabilized, patient taken off bicarb ggt with last bicarb of 22, and is otherwise stable for downgrade to medical floor.   6/17 Transferred to SCU  6/18 Diet advanced to clears.

## 2022-06-20 NOTE — PROGRESS NOTE ADULT - PROBLEM SELECTOR PLAN 1
Currently maintaining oxygen saturation on RA.  Did not receive Remedesivir   secondary to renal failure.  Monitor oxygen saturation.  Continue supportive measures.  Pt does not want further lab work.

## 2022-06-20 NOTE — PROGRESS NOTE ADULT - SUBJECTIVE AND OBJECTIVE BOX
MAXIMINO MASCORRO  81y  Patient is a 81y old  Female who presents with a chief complaint of renal failure (19 Jun 2022 16:33)    HPI:  Seen and examined. No new complaints at this time.    HEALTH ISSUES - PROBLEM Dx:  Acute renal failure    Debility    Palliative care encounter    COVID    GIB (gastrointestinal bleeding)    Anemia secondary to renal failure    Pyelonephritis    Transaminitis    Advance care planning    Severe protein-calorie malnutrition    ESRD (end stage renal disease)    2019 novel coronavirus disease (COVID-19)          MEDICATIONS  (STANDING):  meropenem  IVPB 500 milliGRAM(s) IV Intermittent every 24 hours  pantoprazole  Injectable 40 milliGRAM(s) IV Push two times a day    MEDICATIONS  (PRN):  acetaminophen     Tablet .. 650 milliGRAM(s) Oral every 6 hours PRN Mild Pain (1 - 3)    Vital Signs Last 24 Hrs  T(C): 36.7 (20 Jun 2022 05:00), Max: 37.6 (19 Jun 2022 13:12)  T(F): 98.1 (20 Jun 2022 05:00), Max: 99.7 (19 Jun 2022 21:00)  HR: 84 (20 Jun 2022 05:00) (69 - 84)  BP: 128/97 (20 Jun 2022 05:00) (128/97 - 159/56)  BP(mean): --  RR: 17 (20 Jun 2022 05:00) (17 - 18)  SpO2: 98% (20 Jun 2022 05:00) (96% - 100%)  Daily     Daily     PHYSICAL EXAM:  Constitutional:  She appears comfortable and not distressed. Not diaphoretic.    Neck:  The thyroid is normal. Trachea is midline.     Breasts: Normal examination.    Respiratory: The lungs are clear to auscultation. No dullness and expansion is normal.    Cardiovascular: S1 and S2 are normal. No mummurs, rubs or gallops are present.    Gastrointestinal: The abdomen is soft. No tenderness is present. No masses are present. Bowel sounds are normal.    Genitourinary: The bladder is not distended. No CVA tenderness is present. Perez with dark urine.    Extremities: No edema is noted. No deformities are present.    Neurological: Cognition is normal. Tone, power and sensation are normal. Gait is steady.    Skin: No lesions are seen  or palpated.    Lymph Nodes: No lymphadenopathy is present.    Psychiatric: Mood is appropriate. No hallucinations or flight of ideas are noted.

## 2022-06-20 NOTE — PROGRESS NOTE ADULT - PROBLEM SELECTOR PLAN 3
+Chronic cardoso  UC growing proteus mirabilis   afebrile, no leukocytosis  Continue Meropenem   F/u consult with ID Dr. Schaefer.

## 2022-06-20 NOTE — PROGRESS NOTE ADULT - PROBLEM SELECTOR PLAN 6
Pt previously on home hospice with [facility] , has 24hour  HHA  Pt is DNR/DNI / no HD/ no invasive or aggressive measures, no central line.   No further labs or tests as per patient's wishes.  Palliative following.

## 2022-06-20 NOTE — PROGRESS NOTE ADULT - PROBLEM SELECTOR PLAN 4
Secondary to chronic renal failure.  Patient refusing any more labs.  No outward sign of bleeding.  No pharmacologic DVT prophylaxis.

## 2022-06-20 NOTE — PROGRESS NOTE ADULT - SUBJECTIVE AND OBJECTIVE BOX
MAXIMINO MASCORRO    SCU progress note    INTERVAL HPI/OVERNIGHT EVENTS: ***No overnight events.    DNR [x ]   DNI  [x  ]    Covid - 19 PCR: Positive 6/15    The 4Ms    What Matters Most: see GOC  Age appropriate Medications/Screen for High Risk Medication: Yes  Mentation: see CAM below  Mobility: defer to physical exam    The Confusion Assessment Method (CAM) Diagnostic Algorithm     1: Acute Onset or Fluctuating Course  - Is there evidence of an acute change in mental status from the patient’s baseline? Did the (abnormal) behavior  fluctuate during the day, that is, tend to come and go, or increase and decrease in severity?  [ ] YES [x ] NO     2: Inattention  - Did the patient have difficulty focusing attention, being easily distractible, or having difficulty keeping track of what was being said?  [ ] YES [x ] NO     3: Disorganized thinking  -Was the patient’s thinking disorganized or incoherent, such as rambling or irrelevant conversation, unclear or illogical flow of ideas, or unpredictable switching from subject to subject?  [ ] YES [x ] NO    4: Altered Level of consciousness?  [ ] YES [x ] NO    The diagnosis of delirium by CAM requires the presence of features 1 and 2 and either 3 or 4.    PRESSORS: [ ] YES [x ] NO  meropenem  IVPB 500 milliGRAM(s) IV Intermittent every 24 hours    Cardiovascular:  Heart Failure  Acute   Acute on Chronic  Chronic         Pulmonary:    Hematalogic:    Other:  acetaminophen     Tablet .. 650 milliGRAM(s) Oral every 6 hours PRN  pantoprazole  Injectable 40 milliGRAM(s) IV Push two times a day    acetaminophen     Tablet .. 650 milliGRAM(s) Oral every 6 hours PRN  meropenem  IVPB 500 milliGRAM(s) IV Intermittent every 24 hours  pantoprazole  Injectable 40 milliGRAM(s) IV Push two times a day    Drug Dosing Weight  Height (cm): 162.6 (16 Jun 2022 02:55)  Weight (kg): 58.8 (16 Jun 2022 02:55)  BMI (kg/m2): 22.2 (16 Jun 2022 02:55)  BSA (m2): 1.63 (16 Jun 2022 02:55)    CENTRAL LINE: [ ] YES [x ] NO  LOCATION:   DATE INSERTED:  REMOVE: [ ] YES x[ ] NO  EXPLAIN:    CARDOSO: [x ] YES [ ] NO    DATE INSERTED:  REMOVE:  [ ] YES [x ] NO  EXPLAIN:   Chronic cardoso    PAST MEDICAL & SURGICAL HISTORY:  HTN (hypertension)      CVA (cerebrovascular accident)      CRI (chronic renal insufficiency)      SBO (small bowel obstruction)      No significant past surgical history                  06-19 @ 07:01  -  06-20 @ 07:00  --------------------------------------------------------  IN: 0 mL / OUT: 1900 mL / NET: -1900 mL            PHYSICAL EXAM:    GENERAL: NAD, well-groomed, well-developed  HEAD:  Atraumatic, Normocephalic  EYES: EOMI, PERRLA, conjunctiva and sclera clear  ENMT: No tonsillar erythema, exudates  NECK: Supple, No JVD  NERVOUS SYSTEM:  Alert & Oriented X3, Follows commands, moving all extremities  CHEST/LUNG: Clear to percussion bilaterally; No rales, rhonchi, wheezing, or rubs  HEART: +SHO II/VI  ABDOMEN: Soft, Nontender, Nondistended; Bowel sounds present  EXTREMITIES:  2+ Peripheral Pulses, No clubbing, cyanosis, or edema  LYMPH: No lymphadenopathy noted  SKIN: No rashes or lesions      LABS:                    [  ]  DVT Prophylaxis  [  ]  Nutrition, Brand, Rate         Goal Rate        Abnormal Nutritional Status -  Malnutrition   Cachexia      Morbid Obesity BMI >/=40    RADIOLOGY & ADDITIONAL STUDIES:  ***    < from: Xray Chest 1 View- PORTABLE-Urgent (06.15.22 @ 21:01) >  COMPARISON: None available.    Elevated diaphragms noted.    Heart magnified by technique.    Lungs are clear.    IMPRESSION: No acute finding.      < end of copied text >    Goals of Care Discussion with Family/Proxy/Other   - see note from 6/16/22

## 2022-06-20 NOTE — PROGRESS NOTE ADULT - PROBLEM SELECTOR PLAN 2
Presented with Cr 8.28, BUN 93, bicarb 8, pH 7.04  s/p bicarb drip  CO2 normalized  Pt does not want HD  DNR/DNI no invasive or aggressive measures, no central lines  Palliative following  Fluid restriction 1liter/day.

## 2022-06-21 LAB
CULTURE RESULTS: SIGNIFICANT CHANGE UP
CULTURE RESULTS: SIGNIFICANT CHANGE UP
SPECIMEN SOURCE: SIGNIFICANT CHANGE UP
SPECIMEN SOURCE: SIGNIFICANT CHANGE UP

## 2022-06-21 RX ADMIN — PANTOPRAZOLE SODIUM 40 MILLIGRAM(S): 20 TABLET, DELAYED RELEASE ORAL at 06:00

## 2022-06-21 RX ADMIN — PANTOPRAZOLE SODIUM 40 MILLIGRAM(S): 20 TABLET, DELAYED RELEASE ORAL at 17:51

## 2022-06-21 RX ADMIN — MEROPENEM 100 MILLIGRAM(S): 1 INJECTION INTRAVENOUS at 17:50

## 2022-06-21 NOTE — PROGRESS NOTE ADULT - SUBJECTIVE AND OBJECTIVE BOX
MAXIMINO MASCORRO    SCU progress note    INTERVAL HPI/OVERNIGHT EVENTS: no acute events overnight    DNR [x ]   DNI  [ x]    Covid - 19 PCR: COVID + 6/15/22    The 4Ms    What Matters Most: see [facility]  Age appropriate Medications/Screen for High Risk Medication: Yes  Mentation: see CAM below  Mobility: defer to physical exam    The Confusion Assessment Method (CAM) Diagnostic Algorithm     1: Acute Onset or Fluctuating Course  - Is there evidence of an acute change in mental status from the patient’s baseline? Did the (abnormal) behavior  fluctuate during the day, that is, tend to come and go, or increase and decrease in severity?  [ ] YES [X ] NO     2: Inattention  - Did the patient have difficulty focusing attention, being easily distractible, or having difficulty keeping track of what was being said?  [ ] YES [X ] NO     3: Disorganized thinking  -Was the patient’s thinking disorganized or incoherent, such as rambling or irrelevant conversation, unclear or illogical flow of ideas, or unpredictable switching from subject to subject?  [ ] YES X[ ] NO    4: Altered Level of consciousness?  [ ] YES [X ] NO    The diagnosis of delirium by CAM requires the presence of features 1 and 2 and either 3 or 4.    PRESSORS: [ ] YES [ X] NO  meropenem  IVPB 500 milliGRAM(s) IV Intermittent every 24 hours    Cardiovascular:  Heart Failure  Acute   Acute on Chronic  Chronic         Pulmonary:    Hematalogic:    Other:  acetaminophen     Tablet .. 650 milliGRAM(s) Oral every 6 hours PRN  pantoprazole  Injectable 40 milliGRAM(s) IV Push two times a day    acetaminophen     Tablet .. 650 milliGRAM(s) Oral every 6 hours PRN  meropenem  IVPB 500 milliGRAM(s) IV Intermittent every 24 hours  pantoprazole  Injectable 40 milliGRAM(s) IV Push two times a day    Drug Dosing Weight  Height (cm): 162.6 (16 Jun 2022 02:55)  Weight (kg): 58.8 (16 Jun 2022 02:55)  BMI (kg/m2): 22.2 (16 Jun 2022 02:55)  BSA (m2): 1.63 (16 Jun 2022 02:55)    CENTRAL LINE: [ ] YES [X ] NO  LOCATION:   DATE INSERTED:  REMOVE: [ ] YES [ ] NO  EXPLAIN:    CARDOSO: [ X] YES [ ] NO    DATE INSERTED:  REMOVE:  [ ] YES [ ] NO  EXPLAIN: Chronic cardoso    PAST MEDICAL & SURGICAL HISTORY:  HTN (hypertension)  CVA (cerebrovascular accident)  CRI (chronic renal insufficiency)  SBO (small bowel obstruction)    No significant past surgical history      06-20 @ 07:01  -  06-21 @ 07:00  --------------------------------------------------------  IN: 100 mL / OUT: 1900 mL / NET: -1800 mL      PHYSICAL EXAM:    GENERAL: NAD, well-groomed, well-developed  HEAD:  Normocephalic  EYES: Moist mucous membranes   NECK: Supple, No JVD  NERVOUS SYSTEM:  Alert & Oriented X3, Follows commands, moving all extremities  CHEST/LUNG: diminished bibasilar, respirations even and unlabored, no cough.   HEART: SHO, regular rate and rhythm   ABDOMEN: Soft, Nontender, Nondistended; Bowel sounds present  EXTREMITIES:  2+ Peripheral Pulses, No clubbing, cyanosis, or edema  LYMPH: No lymphadenopathy noted  SKIN: warm, good color     LABS: no labs since 6/18, patient refused.         [  ]  DVT Prophylaxis  [  ]  Nutrition diet     RADIOLOGY & ADDITIONAL STUDIES:        ACC: [id number] EXAM:  XR CHEST PORTABLE URGENT 1V                          PROCEDURE DATE:  06/15/2022          INTERPRETATION:  AP semierect chest on Pinky 15, 2022 at 8:49 PM. Patient   has hematuria and weakness.    COMPARISON: None available.    Elevated diaphragms noted.    Heart magnified by technique.    Lungs are clear.    IMPRESSION: No acute finding.    --- End of Report ---        Goals of Care Discussion with Family/Proxy/Other   - see note from 6/16/22

## 2022-06-21 NOTE — PROGRESS NOTE ADULT - PROBLEM SELECTOR PLAN 3
+Chronic cadroso  UC growing proteus mirabilis  afebrile, no leukocytosis  Continue Meropenem  F/u consult with ID Dr. Schaefer  Plan to discuss if meropenem able to be administered at home

## 2022-06-21 NOTE — PROGRESS NOTE ADULT - PROBLEM SELECTOR PLAN 2
Presented with Cr 8.28, BUN 93, bicarb 8, pH 7.04  s/p bicarb drip  CO2 normalized  Pt does not want HD  DNR/DNI no invasive or aggressive measures, no central lines  Palliative following  Fluid restriction 1liter/day, renal diet

## 2022-06-21 NOTE — PROGRESS NOTE ADULT - ASSESSMENT
81 year old female from home with HHA, with PMHx of CVA, HTN, HLD, SBO and renal insufficiency and chronic Perez who presented to the ED with c/o vomiting 6/15/22. As per son Reid ([phone]), patient has been having vomiting x 2 days- non-bloody and white in color. She also has been having diarrhea x 2 days, cough with phlegm but no fever. He also reports her urine being much darker but very little in amount. Patient had ESRD in the past and was offered HD but refused and was discharged home with home hospice. However, she improved and was taken off hospice. Patient remains DNR/ DNI/ no hemodialysis or central line. She was found to be COVID positive with transaminitis but was saturating well on room air; no indication for steroids/ no remdesivir due to renal failure. Admission labs notable for Creat 6, K 6.3, bicarb 8, pH 7.04 started on bicarb gtt with nephrology following, Hgb 9.1 with concern for GI Bleed due to occult positive stool. Per family colonoscopy was refused with plan to focus on conservative measures and she was kept NPO with protonix gtt initiated, found to have UTI with concern for pyelonephritis due to flank pain for which zosyn was initiated. Pt transferred to SCU 6/17 with course notable for advanced diet and no further lab draws per patient request, ID following with antibiotics adjusted to meropenem with plan for 10 day course.

## 2022-06-21 NOTE — CHART NOTE - NSCHARTNOTEFT_GEN_A_CORE
Spoke with patient son Prataab, allowed for and answered all questions. During phone conversation Prataab was requesting to know patients morning labs specifically kidney function, he was told that patient was refusing labs and labs are not clinically indicated given hospice status, patient son verbalized understanding that labs were not indicated however insisted on labs being drawn day of discharge as he wants to know if creatinine level decreased. Pt son did acknowledge understanding of mom being DNR/DNI/hospice and not wanting invasive procedures such as dialysis. Dr Dutton made aware of son request.

## 2022-06-21 NOTE — PROGRESS NOTE ADULT - ASSESSMENT
LIAM:  She stated that she does not want HD and her son concurs.  - Will hold HD for now.    Hyperkalemia:  Due to LIAM and Metabolic Acidosis And improved.      Metabolic Acidosis:  Improved now.    Sam Coyle MD  Nephrology

## 2022-06-21 NOTE — PROGRESS NOTE ADULT - PROBLEM SELECTOR PLAN 6
Pt previously on home hospice with [facility] , has 24hour  HHA  Pt is DNR/DNI / no HD/ no invasive or aggressive measures, no central line.  No further labs or tests as per patient's wishes.  Palliative following.

## 2022-06-21 NOTE — PROGRESS NOTE ADULT - PROBLEM SELECTOR PLAN 1
Currently maintaining oxygen saturation on RA.  Not a candidate for Remedesivir secondary to renal failure.  Monitor oxygen saturation.  Continue supportive measures.  Pt does not want further lab work.

## 2022-06-21 NOTE — PROGRESS NOTE ADULT - SUBJECTIVE AND OBJECTIVE BOX
MAXIMINO MASCORRO  81y  Patient is a 81y old  Female who presents with a chief complaint of renal failure (21 Jun 2022 11:52)    HPI:  Elderly woman with CKD. Refusing HD and other invasive procedures. Refusing labs also.  No new symptoms.    HEALTH ISSUES - PROBLEM Dx:  Acute renal failure    Debility    Palliative care encounter    COVID    GIB (gastrointestinal bleeding)    Anemia secondary to renal failure    Pyelonephritis    Transaminitis    Advance care planning    Severe protein-calorie malnutrition    ESRD (end stage renal disease)    2019 novel coronavirus disease (COVID-19)          MEDICATIONS  (STANDING):  meropenem  IVPB 500 milliGRAM(s) IV Intermittent every 24 hours  pantoprazole  Injectable 40 milliGRAM(s) IV Push two times a day    MEDICATIONS  (PRN):  acetaminophen     Tablet .. 650 milliGRAM(s) Oral every 6 hours PRN Mild Pain (1 - 3)    Vital Signs Last 24 Hrs  T(C): 36.9 (21 Jun 2022 14:08), Max: 37.3 (20 Jun 2022 21:00)  T(F): 98.4 (21 Jun 2022 14:08), Max: 99.2 (20 Jun 2022 21:00)  HR: 86 (21 Jun 2022 14:08) (72 - 86)  BP: 127/63 (21 Jun 2022 14:08) (127/63 - 156/51)  BP(mean): --  RR: 19 (21 Jun 2022 14:08) (18 - 19)  SpO2: 98% (21 Jun 2022 14:08) (98% - 100%)  Daily     Daily     PHYSICAL EXAM:  Constitutional:   appears comfortable and not distressed. Not diaphoretic.    Neck:  The thyroid is normal. Trachea is midline.     Respiratory: The lungs are clear to auscultation. No dullness and expansion is normal.    Cardiovascular: S1 and S2 are normal. No mummurs, rubs or gallops are present.    Gastrointestinal: The abdomen is soft. No tenderness is present. No masses are present. Bowel sounds are normal.    Genitourinary: The bladder is not distended. No CVA tenderness is present.    Extremities: No edema is noted. No deformities are present.    Neurological: Cognition is normal. Tone, power and sensation are normal. Gait is steady.    Skin: No lesions are seen  or palpated.    Lymph Nodes: No lymphadenopathy is present.

## 2022-06-22 RX ADMIN — PANTOPRAZOLE SODIUM 40 MILLIGRAM(S): 20 TABLET, DELAYED RELEASE ORAL at 17:43

## 2022-06-22 RX ADMIN — PANTOPRAZOLE SODIUM 40 MILLIGRAM(S): 20 TABLET, DELAYED RELEASE ORAL at 06:23

## 2022-06-22 RX ADMIN — MEROPENEM 100 MILLIGRAM(S): 1 INJECTION INTRAVENOUS at 17:43

## 2022-06-22 NOTE — PROGRESS NOTE ADULT - PROBLEM SELECTOR PLAN 1
Currently maintaining oxygen saturation on RA.  Not a candidate for Remedesivir secondary to renal failure.  Monitor oxygen saturation.  Continue supportive measures.

## 2022-06-22 NOTE — PROGRESS NOTE ADULT - SUBJECTIVE AND OBJECTIVE BOX
81y Female    Meds:  meropenem  IVPB 500 milliGRAM(s) IV Intermittent every 24 hours    Allergies    No Known Allergies    Intolerances        VITALS:  Vital Signs Last 24 Hrs  T(C): 37.1 (22 Jun 2022 21:01), Max: 37.2 (22 Jun 2022 07:18)  T(F): 98.7 (22 Jun 2022 21:01), Max: 98.9 (22 Jun 2022 07:18)  HR: 87 (22 Jun 2022 21:01) (69 - 89)  BP: 131/59 (22 Jun 2022 21:01) (129/53 - 147/47)  BP(mean): --  RR: 17 (22 Jun 2022 21:01) (17 - 69)  SpO2: 100% (22 Jun 2022 21:01) (98% - 100%)    LABS/DIAGNOSTIC TESTS:                      CULTURES: Clean Catch Clean Catch (Midstream)  06-16 @ 05:57   >100,000 CFU/ml Proteus mirabilis ESBL  --  Proteus mirabilis ESBL      .Blood Blood  06-16 @ 03:38   No Growth Final  --  --            RADIOLOGY:      ROS:  [  ] UNABLE TO ELICIT 81y Female who is clinically doing well , she has a slight cough but denies being SOB, she has no chest pain, no fevers or chills, she has no nausea, vomiting or diarrhea. Her urine in her cardoso is clear. She will be going home on home hospice from here.    Meds:  meropenem  IVPB 500 milliGRAM(s) IV Intermittent every 24 hours    Allergies    No Known Allergies    Intolerances        VITALS:  Vital Signs Last 24 Hrs  T(C): 37.1 (22 Jun 2022 21:01), Max: 37.2 (22 Jun 2022 07:18)  T(F): 98.7 (22 Jun 2022 21:01), Max: 98.9 (22 Jun 2022 07:18)  HR: 87 (22 Jun 2022 21:01) (69 - 89)  BP: 131/59 (22 Jun 2022 21:01) (129/53 - 147/47)  BP(mean): --  RR: 17 (22 Jun 2022 21:01) (17 - 69)  SpO2: 100% (22 Jun 2022 21:01) (98% - 100%)    LABS/DIAGNOSTIC TESTS:                      CULTURES: Clean Catch Clean Catch (Midstream)  06-16 @ 05:57   >100,000 CFU/ml Proteus mirabilis ESBL  --  Proteus mirabilis ESBL      .Blood Blood  06-16 @ 03:38   No Growth Final  --  --            RADIOLOGY:      ROS:  [  ] UNABLE TO ELICIT

## 2022-06-22 NOTE — PROGRESS NOTE ADULT - GASTROINTESTINAL
soft/nontender/nondistended/normal active bowel sounds/no guarding/no rigidity/no masses palpable details…

## 2022-06-22 NOTE — PROGRESS NOTE ADULT - SUBJECTIVE AND OBJECTIVE BOX
MAXIMINO MASCORRO    SCU progress note    INTERVAL HPI/OVERNIGHT EVENTS: ***    DNR [ ]   DNI  [  ]    Covid - 19 PCR:     The 4Ms    What Matters Most: see GOC  Age appropriate Medications/Screen for High Risk Medication: Yes  Mentation: see CAM below  Mobility: defer to physical exam    The Confusion Assessment Method (CAM) Diagnostic Algorithm     1: Acute Onset or Fluctuating Course  - Is there evidence of an acute change in mental status from the patient’s baseline? Did the (abnormal) behavior  fluctuate during the day, that is, tend to come and go, or increase and decrease in severity?  [ ] YES [ ] NO     2: Inattention  - Did the patient have difficulty focusing attention, being easily distractible, or having difficulty keeping track of what was being said?  [ ] YES [ ] NO     3: Disorganized thinking  -Was the patient’s thinking disorganized or incoherent, such as rambling or irrelevant conversation, unclear or illogical flow of ideas, or unpredictable switching from subject to subject?  [ ] YES [ ] NO    4: Altered Level of consciousness?  [ ] YES [ ] NO    The diagnosis of delirium by CAM requires the presence of features 1 and 2 and either 3 or 4.    PRESSORS: [ ] YES [ ] NO  meropenem  IVPB 500 milliGRAM(s) IV Intermittent every 24 hours    Cardiovascular:  Heart Failure  Acute   Acute on Chronic  Chronic         Pulmonary:    Hematalogic:    Other:  acetaminophen     Tablet .. 650 milliGRAM(s) Oral every 6 hours PRN  pantoprazole  Injectable 40 milliGRAM(s) IV Push two times a day    acetaminophen     Tablet .. 650 milliGRAM(s) Oral every 6 hours PRN  meropenem  IVPB 500 milliGRAM(s) IV Intermittent every 24 hours  pantoprazole  Injectable 40 milliGRAM(s) IV Push two times a day    Drug Dosing Weight  Height (cm): 162.6 (16 Jun 2022 02:55)  Weight (kg): 58.8 (16 Jun 2022 02:55)  BMI (kg/m2): 22.2 (16 Jun 2022 02:55)  BSA (m2): 1.63 (16 Jun 2022 02:55)    CENTRAL LINE: [ ] YES [ ] NO  LOCATION:   DATE INSERTED:  REMOVE: [ ] YES [ ] NO  EXPLAIN:    BUCKNER: [ ] YES [ ] NO    DATE INSERTED:  REMOVE:  [ ] YES [ ] NO  EXPLAIN:    PAST MEDICAL & SURGICAL HISTORY:  HTN (hypertension)      CVA (cerebrovascular accident)      CRI (chronic renal insufficiency)      SBO (small bowel obstruction)      No significant past surgical history                  06-21 @ 07:01  -  06-22 @ 07:00  --------------------------------------------------------  IN: 0 mL / OUT: 600 mL / NET: -600 mL            PHYSICAL EXAM:    GENERAL: NAD, well-groomed, well-developed  HEAD:  Atraumatic, Normocephalic  EYES: EOMI, PERRLA, conjunctiva and sclera clear  ENMT: No tonsillar erythema, exudates, or enlargement; Moist mucous membranes, Good dentition, No lesions  NECK: Supple, No JVD, Normal thyroid  NERVOUS SYSTEM:  Alert & Oriented X3, Good concentration; Motor Strength 5/5 B/L upper and lower extremities; DTRs 2+ intact and symmetric  CHEST/LUNG: Clear to percussion bilaterally; No rales, rhonchi, wheezing, or rubs  HEART: Regular rate and rhythm; No murmurs, rubs, or gallops  ABDOMEN: Soft, Nontender, Nondistended; Bowel sounds present  EXTREMITIES:  2+ Peripheral Pulses, No clubbing, cyanosis, or edema  LYMPH: No lymphadenopathy noted  SKIN: No rashes or lesions      LABS:                    [  ]  DVT Prophylaxis  [  ]  Nutrition, Brand, Rate         Goal Rate        Abnormal Nutritional Status -  Malnutrition   Cachexia      Morbid Obesity BMI >/=40    RADIOLOGY & ADDITIONAL STUDIES:  ***    Goals of Care Discussion with Family/Proxy/Other   - see note from/family meeting set up for...     MAXIMINO MASCORRO    SCU progress note    INTERVAL HPI/OVERNIGHT EVENTS: *** Patient seen and examined at bedside. Patient comfortable calm, no overnight events    DNR [x ]   DNI  [ x ]    Covid - 19 PCR: positive 6/15    The 4Ms    What Matters Most: see GOC  Age appropriate Medications/Screen for High Risk Medication: Yes  Mentation: see CAM below  Mobility: defer to physical exam    The Confusion Assessment Method (CAM) Diagnostic Algorithm     1: Acute Onset or Fluctuating Course  - Is there evidence of an acute change in mental status from the patient’s baseline? Did the (abnormal) behavior  fluctuate during the day, that is, tend to come and go, or increase and decrease in severity?  [ ] YES [ x] NO      2: Inattention  - Did the patient have difficulty focusing attention, being easily distractible, or having difficulty keeping track of what was being said?  [ ] YES [x ] NO     3: Disorganized thinking  -Was the patient’s thinking disorganized or incoherent, such as rambling or irrelevant conversation, unclear or illogical flow of ideas, or unpredictable switching from subject to subject?  [ ] YES [ ] NO unabl;e to assess    4: Altered Level of consciousness?  [ ] YES [ x] NO    The diagnosis of delirium by CAM requires the presence of features 1 and 2 and either 3 or 4.    PRESSORS: [ ] YES [x ] NO  meropenem  IVPB 500 milliGRAM(s) IV Intermittent every 24 hours    Cardiovascular:  Heart Failure  Acute   Acute on Chronic  Chronic         Pulmonary:    Hematalogic:    Other:  acetaminophen     Tablet .. 650 milliGRAM(s) Oral every 6 hours PRN  pantoprazole  Injectable 40 milliGRAM(s) IV Push two times a day    acetaminophen     Tablet .. 650 milliGRAM(s) Oral every 6 hours PRN  meropenem  IVPB 500 milliGRAM(s) IV Intermittent every 24 hours  pantoprazole  Injectable 40 milliGRAM(s) IV Push two times a day    Drug Dosing Weight  Height (cm): 162.6 (16 Jun 2022 02:55)  Weight (kg): 58.8 (16 Jun 2022 02:55)  BMI (kg/m2): 22.2 (16 Jun 2022 02:55)  BSA (m2): 1.63 (16 Jun 2022 02:55)    CENTRAL LINE: [ ] YES [x ] NO  LOCATION:   DATE INSERTED:  REMOVE: [ ] YES [ ] NO  EXPLAIN:    CARDOSO: [x ] YES [ ] NO    DATE INSERTED: 6/16/22  REMOVE:  [ ] YES [ x] NO  EXPLAIN: chronic cardoso    PAST MEDICAL & SURGICAL HISTORY:  HTN (hypertension)      CVA (cerebrovascular accident)      CRI (chronic renal insufficiency)      SBO (small bowel obstruction)      No significant past surgical history      06-21 @ 07:01  -  06-22 @ 07:00  --------------------------------------------------------  IN: 0 mL / OUT: 600 mL / NET: -600 mL    PHYSICAL EXAM:  GENERAL: NAD, well-groomed, well-developed  HEAD:  Atraumatic, Normocephalic  EYES: EOMI, PERRLA, conjunctiva and sclera clear  ENMT: No tonsillar erythema, exudates, or enlargement; Moist mucous membranes, Good dentition  NECK: Supple, No JVD, Normal thyroid  NERVOUS SYSTEM:  Alert & Oriented X2-3, Good concentration  CHEST/LUNG: Clear  to percussion bilaterally; No rales, rhonchi, wheezing, or rubs  HEART: Regular rate and rhythm; Grade 1-2/6  murmurs, loudest at left 2nd ICS.    ABDOMEN: Soft, Nontender, Nondistended; Bowel sounds present  EXTREMITIES:  2+ Peripheral Pulses, No clubbing, cyanosis, or edema  LYMPH: No lymphadenopathy noted  SKIN: No rashes or lesions      LABS:    [  ]  DVT Prophylaxis  [  ]  Nutrition, Brand, Rate         Goal Rate        Abnormal Nutritional Status -  Malnutrition   Cachexia      Morbid Obesity BMI >/=40    RADIOLOGY & ADDITIONAL STUDIES:  ***  < from: Xray Chest 1 View- PORTABLE-Urgent (06.15.22 @ 21:01) >  PROCEDURE DATE:  06/15/2022          INTERPRETATION:  AP semierect chest on Pinky 15, 2022 at 8:49 PM. Patient   has hematuria and weakness.    COMPARISON: None available.    Elevated diaphragms noted.    Heart magnified by technique.    Lungs are clear.    IMPRESSION: No acute finding.    < end of copied text >      Goals of Care Discussion with Family/Proxy/Other   - see note from 6/16/22

## 2022-06-22 NOTE — PROGRESS NOTE ADULT - SUBJECTIVE AND OBJECTIVE BOX
MAXIMINO MASCORRO  81y  Patient is a 81y old  Female who presents with a chief complaint of renal failure (22 Jun 2022 15:58)    HPI:  Seen and examined. No new complaints at this time. ESRD not on HD.    HEALTH ISSUES - PROBLEM Dx:  Acute renal failure    Debility    Palliative care encounter    COVID    GIB (gastrointestinal bleeding)    Anemia secondary to renal failure    Pyelonephritis    Transaminitis    Advance care planning    Severe protein-calorie malnutrition    ESRD (end stage renal disease)    2019 novel coronavirus disease (COVID-19)          MEDICATIONS  (STANDING):  meropenem  IVPB 500 milliGRAM(s) IV Intermittent every 24 hours  pantoprazole  Injectable 40 milliGRAM(s) IV Push two times a day    MEDICATIONS  (PRN):  acetaminophen     Tablet .. 650 milliGRAM(s) Oral every 6 hours PRN Mild Pain (1 - 3)    Vital Signs Last 24 Hrs  T(C): 37 (22 Jun 2022 13:03), Max: 37.3 (21 Jun 2022 21:06)  T(F): 98.6 (22 Jun 2022 13:03), Max: 99.2 (21 Jun 2022 21:06)  HR: 89 (22 Jun 2022 13:03) (69 - 89)  BP: 129/53 (22 Jun 2022 13:03) (129/53 - 147/47)  BP(mean): --  RR: 18 (22 Jun 2022 13:03) (17 - 69)  SpO2: 100% (22 Jun 2022 13:03) (98% - 100%)  Daily     Daily     PHYSICAL EXAM:  Constitutional:  She appears comfortable and not distressed. Not diaphoretic.    Neck:  The thyroid is normal. Trachea is midline.     Respiratory: The lungs are clear to auscultation. No dullness and expansion is normal.    Cardiovascular: S1 and S2 are normal. No mummurs, rubs or gallops are present.    Gastrointestinal: The abdomen is soft. No tenderness is present. No masses are present. Bowel sounds are normal.    Genitourinary: The bladder is not distended. No CVA tenderness is present.    Extremities: No edema is noted. No deformities are present.    Neurological: Cognition is normal. Tone, power and sensation are normal.     Skin: No leasions are seen  or palpated.    Lymph Nodes: No lymphadenopathy is present.

## 2022-06-22 NOTE — PROGRESS NOTE ADULT - ASSESSMENT
UTI ( ESBL Proteus)    Plan - Cont Meropenem 500mgs iv q24hrs   DC all antibiotics tomorrow.  DC planning  reconsult prn.

## 2022-06-22 NOTE — PROGRESS NOTE ADULT - ASSESSMENT
81 year old female from home with HHA, with PMHx of CVA, HTN, HLD, SBO and renal insufficiency and chronic Perez who presented to the ED with c/o vomiting 6/15/22. As per son Reid ([phone]), patient has been having vomiting x 2 days- non-bloody and white in color. She also has been having diarrhea x 2 days, cough with phlegm but no fever. He also reports her urine being much darker but very little in amount. Patient had ESRD in the past and was offered HD but refused and was discharged home with home hospice. However, she improved and was taken off hospice. Patient remains DNR/ DNI/ no hemodialysis or central line. She was found to be COVID positive with transaminitis but was saturating well on room air; no indication for steroids/ no remdesivir due to renal failure. Admission labs notable for Creat 6, K 6.3, bicarb 8, pH 7.04 started on bicarb gtt with nephrology following, Hgb 9.1 with concern for GI Bleed due to occult positive stool. Per family colonoscopy was refused with plan to focus on conservative measures and she was kept NPO with protonix gtt initiated, found to have UTI with concern for pyelonephritis due to flank pain for which zosyn was initiated. Pt transferred to SCU 6/17 with course notable for advanced diet and no further lab draws per patient request, ID following with antibiotics adjusted to meropenem with plan for 10 day course.

## 2022-06-22 NOTE — PROGRESS NOTE ADULT - ASSESSMENT
LIAM:  She stated that she does not want HD and her son concurs.  - Will hold HD for now.    Hyperkalemia:  Due to LIAM and Metabolic Acidosis and has improved.      Metabolic Acidosis:  Improved now.    Sam Coyle MD  Nephrology

## 2022-06-22 NOTE — PROGRESS NOTE ADULT - PROBLEM SELECTOR PLAN 6
Pt previously on home hospice with [facility] , has 24hour  HHA  Pt is DNR/DNI / no HD/ no invasive or aggressive measures, no central line.  No further labs or tests- patient refusing  consider repeating BMP for son- requesting Creatinine level on day of discharge  Palliative following.

## 2022-06-23 RX ORDER — ACETAMINOPHEN 500 MG
650 TABLET ORAL ONCE
Refills: 0 | Status: COMPLETED | OUTPATIENT
Start: 2022-06-23 | End: 2022-06-23

## 2022-06-23 RX ADMIN — PANTOPRAZOLE SODIUM 40 MILLIGRAM(S): 20 TABLET, DELAYED RELEASE ORAL at 06:24

## 2022-06-23 RX ADMIN — Medication 650 MILLIGRAM(S): at 22:24

## 2022-06-23 RX ADMIN — PANTOPRAZOLE SODIUM 40 MILLIGRAM(S): 20 TABLET, DELAYED RELEASE ORAL at 18:38

## 2022-06-23 RX ADMIN — MEROPENEM 100 MILLIGRAM(S): 1 INJECTION INTRAVENOUS at 18:38

## 2022-06-23 NOTE — PROGRESS NOTE ADULT - SUBJECTIVE AND OBJECTIVE BOX
MAXIMINO MASCORRO    SCU progress note    INTERVAL HPI/OVERNIGHT EVENTS: ***No overnight events    DNR [x ]   DNI  [x]    Covid - 19 PCR: Positive 6/15    The 4Ms    What Matters Most: see GOC  Age appropriate Medications/Screen for High Risk Medication: Yes  Mentation: see CAM below  Mobility: defer to physical exam    The Confusion Assessment Method (CAM) Diagnostic Algorithm     1: Acute Onset or Fluctuating Course  - Is there evidence of an acute change in mental status from the patient’s baseline? Did the (abnormal) behavior  fluctuate during the day, that is, tend to come and go, or increase and decrease in severity?  [ ] YES [x ] NO     2: Inattention  - Did the patient have difficulty focusing attention, being easily distractible, or having difficulty keeping track of what was being said?  [ ] YES [x ] NO     3: Disorganized thinking  -Was the patient’s thinking disorganized or incoherent, such as rambling or irrelevant conversation, unclear or illogical flow of ideas, or unpredictable switching from subject to subject?  [ ] YES [ ] NO    4: Altered Level of consciousness?  [ ] YES [x ] NO    The diagnosis of delirium by CAM requires the presence of features 1 and 2 and either 3 or 4.    PRESSORS: [ ] YES [x ] NO  meropenem  IVPB 500 milliGRAM(s) IV Intermittent every 24 hours    Cardiovascular:  Heart Failure  Acute   Acute on Chronic  Chronic         Pulmonary:    Hematalogic:    Other:  acetaminophen     Tablet .. 650 milliGRAM(s) Oral every 6 hours PRN  pantoprazole  Injectable 40 milliGRAM(s) IV Push two times a day    acetaminophen     Tablet .. 650 milliGRAM(s) Oral every 6 hours PRN  meropenem  IVPB 500 milliGRAM(s) IV Intermittent every 24 hours  pantoprazole  Injectable 40 milliGRAM(s) IV Push two times a day    Drug Dosing Weight  Height (cm): 162.6 (16 Jun 2022 02:55)  Weight (kg): 58.8 (16 Jun 2022 02:55)  BMI (kg/m2): 22.2 (16 Jun 2022 02:55)  BSA (m2): 1.63 (16 Jun 2022 02:55)    CENTRAL LINE: [ ] YES [x ] NO  LOCATION:   DATE INSERTED:  REMOVE: [ ] YES [ ] NO  EXPLAIN:    CARDOSO: [x ] YES [ ] NO    DATE INSERTED:  REMOVE:  [ ] YES [ x] NO  EXPLAIN:   Chronic cardoso    PAST MEDICAL & SURGICAL HISTORY:  HTN (hypertension)      CVA (cerebrovascular accident)      CRI (chronic renal insufficiency)      SBO (small bowel obstruction)      No significant past surgical history                  06-22 @ 07:01  -  06-23 @ 07:00  --------------------------------------------------------  IN: 0 mL / OUT: 1800 mL / NET: -1800 mL            PHYSICAL EXAM:    GENERAL: NAD, well-groomed, well-developed  HEAD:  Atraumatic, Normocephalic  EYES: EOMI, PERRLA, conjunctiva and sclera clear  ENMT: No tonsillar erythema, exudates  NECK: Supple, No JVD  NERVOUS SYSTEM:  Alert & Oriented X3, Follows commands  CHEST/LUNG: Clear to percussion bilaterally; No rales, rhonchi, wheezing, or rubs  HEART: Regular rate and rhythm; +SHO I-II/VI  ABDOMEN: Soft, Nontender, Nondistended; Bowel sounds present  EXTREMITIES:  2+ Peripheral Pulses, No clubbing, cyanosis, or edema  LYMPH: No lymphadenopathy noted  SKIN: No rashes or lesions      LABS:                    [  ]  DVT Prophylaxis  [  ]  Nutrition, Brand, Rate         Goal Rate        Abnormal Nutritional Status -  Malnutrition   Cachexia      Morbid Obesity BMI >/=40    RADIOLOGY & ADDITIONAL STUDIES:  ***  < from: Xray Chest 1 View- PORTABLE-Urgent (06.15.22 @ 21:01) >  Elevated diaphragms noted.    Heart magnified by technique.    Lungs are clear.    IMPRESSION: No acute finding.      < end of copied text >    Goals of Care Discussion with Family/Proxy/Other   - see note from 6/16

## 2022-06-23 NOTE — PROGRESS NOTE ADULT - ASSESSMENT
81 year old female from home with HHA, with PMHx of CVA, HTN, HLD, SBO and renal insufficiency and chronic Perez who presented to the ED with c/o vomiting 6/15/22. As per son Reid ([phone]), patient has been having vomiting x 2 days- non-bloody and white in color. She also has been having diarrhea x 2 days, cough with phlegm but no fever. He also reports her urine being much darker but very little in amount. Patient had ESRD in the past and was offered HD but refused and was discharged home with home hospice. However, she improved and was taken off hospice. Patient remains DNR/ DNI/ no hemodialysis or central line. She was found to be COVID positive with transaminitis but was saturating well on room air; no indication for steroids/ no remdesivir due to renal failure. Admission labs notable for Creat 6, K 6.3, bicarb 8, pH 7.04 started on bicarb gtt with nephrology following, Hgb 9.1 with concern for GI Bleed due to occult positive stool. Per family colonoscopy was refused with plan to focus on conservative measures and she was kept NPO with protonix gtt initiated, found to have UTI with concern for pyelonephritis due to flank pain for which zosyn was initiated. Pt transferred to SCU 6/17 with course notable for advanced diet and no further lab draws per patient request, ID following with antibiotics adjusted to meropenem with plan for 10 day course.

## 2022-06-23 NOTE — CHART NOTE - NSCHARTNOTEFT_GEN_A_CORE
Son updated on patient's status.  informed that patient is refusing lab work.  Discharge planning discussed.  Home with hospice once antibiotics are completed.  Son requesting CMP before discharge.  All questions answered.

## 2022-06-23 NOTE — PROGRESS NOTE ADULT - SUBJECTIVE AND OBJECTIVE BOX
MAXIMINO MASCORRO  81y  Patient is a 81y old  Female who presents with a chief complaint of renal failure (23 Jun 2022 13:01)    HPI:  ESRD refusing HD. No new complaints.    HEALTH ISSUES - PROBLEM Dx:  Acute renal failure    Debility    Palliative care encounter    COVID    GIB (gastrointestinal bleeding)    Anemia secondary to renal failure    Pyelonephritis    Transaminitis    Advance care planning    Severe protein-calorie malnutrition    ESRD (end stage renal disease)    2019 novel coronavirus disease (COVID-19)          MEDICATIONS  (STANDING):  meropenem  IVPB 500 milliGRAM(s) IV Intermittent every 24 hours  pantoprazole  Injectable 40 milliGRAM(s) IV Push two times a day    MEDICATIONS  (PRN):  acetaminophen     Tablet .. 650 milliGRAM(s) Oral every 6 hours PRN Mild Pain (1 - 3)    Vital Signs Last 24 Hrs  T(C): 36.7 (23 Jun 2022 12:45), Max: 37.1 (22 Jun 2022 21:01)  T(F): 98.1 (23 Jun 2022 12:45), Max: 98.7 (22 Jun 2022 21:01)  HR: 81 (23 Jun 2022 12:45) (81 - 87)  BP: 114/53 (23 Jun 2022 12:45) (114/53 - 132/55)  BP(mean): --  RR: 18 (23 Jun 2022 12:45) (17 - 18)  SpO2: 100% (23 Jun 2022 12:45) (99% - 100%)  Daily     Daily     PHYSICAL EXAM:  Constitutional:  She appears comfortable and not distressed. Not diaphoretic.    Neck:  The thyroid is normal. Trachea is midline.     Respiratory: The lungs are clear to auscultation. No dullness and expansion is normal.    Cardiovascular: S1 and S2 are normal. No mummurs, rubs or gallops are present.    Gastrointestinal: The abdomen is soft. No tenderness is present. No masses are present. Bowel sounds are normal.    Genitourinary: The bladder is not distended. No CVA tenderness is present.    Extremities: No edema is noted. No deformities are present.    Neurological: Cognition is normal. Tone, power and sensation are normal. Gait is steady.    Skin: No lesions are seen  or palpated.    Lymph Nodes: No lymphadenopathy is present.

## 2022-06-23 NOTE — CHART NOTE - NSCHARTNOTEFT_GEN_A_CORE
Son updated on patient's status.  Requesting labs to be done. However, pt continues to refuse lab work.  All questions answered.

## 2022-06-23 NOTE — PROGRESS NOTE ADULT - PROBLEM SELECTOR PLAN 3
+Chronic cardoso  proteus mirabilis ESBL- sensitive to Meropenem  afebrile, no leukocytosis  Continue Meropenem until 6/28  ID Dr. Schafeer.

## 2022-06-23 NOTE — PROGRESS NOTE ADULT - PROBLEM SELECTOR PLAN 2
Presented with Cr 8.28, BUN 93, bicarb 8, pH 7.04  s/p bicarb drip  CO2 normalized  Pt does not want HD  DNR/DNI no invasive or aggressive measures, no central lines  Palliative following  Fluid restriction 1liter/day, renal diet.  Currently refusing any lab work.

## 2022-06-23 NOTE — PROGRESS NOTE ADULT - PROBLEM SELECTOR PLAN 6
Pt previously on home hospice with [facility] , has 24hour  HHA  Pt is DNR/DNI / no HD/ no invasive or aggressive measures, no central line.  No further labs or tests- patient refusing  consider repeating BMP for son- requesting Creatinine level on day of discharge  Palliative following.  Patient stable for downgrade to Medicine.

## 2022-06-24 DIAGNOSIS — Z02.9 ENCOUNTER FOR ADMINISTRATIVE EXAMINATIONS, UNSPECIFIED: ICD-10-CM

## 2022-06-24 DIAGNOSIS — N39.0 URINARY TRACT INFECTION, SITE NOT SPECIFIED: ICD-10-CM

## 2022-06-24 PROCEDURE — 99232 SBSQ HOSP IP/OBS MODERATE 35: CPT | Mod: FS

## 2022-06-24 RX ADMIN — MEROPENEM 100 MILLIGRAM(S): 1 INJECTION INTRAVENOUS at 17:43

## 2022-06-24 RX ADMIN — PANTOPRAZOLE SODIUM 40 MILLIGRAM(S): 20 TABLET, DELAYED RELEASE ORAL at 05:50

## 2022-06-24 RX ADMIN — Medication 650 MILLIGRAM(S): at 00:49

## 2022-06-24 RX ADMIN — PANTOPRAZOLE SODIUM 40 MILLIGRAM(S): 20 TABLET, DELAYED RELEASE ORAL at 17:41

## 2022-06-24 NOTE — PROGRESS NOTE ADULT - PROBLEM SELECTOR PLAN 1
Presented with Cr 8.28, BUN 93  s/p bicarb drip  Pt does not want HD  DNR/DNI no invasive or aggressive measures, no central lines  Palliative following  Fluid restriction 1liter/day, renal diet.  Currently refusing any lab work.

## 2022-06-24 NOTE — PROGRESS NOTE ADULT - PROBLEM SELECTOR PLAN 6
pending course of IV abx until 6/28/22 then d/c home with hospice. No outward sign of bleeding.  No pharmacologic DVT prophylaxis

## 2022-06-24 NOTE — PROGRESS NOTE ADULT - PROBLEM SELECTOR PLAN 7
p/w elevated lipase  Tbili WNL  LFT's downtrending  Advance diet as tolerated.   Avoid hepatotoxic agents  DNR/DNI /conservative management

## 2022-06-24 NOTE — PROGRESS NOTE ADULT - PROBLEM SELECTOR PLAN 3
p/w Hb 9.1, reports of dark stool  occult +  tolerating regular diet  s/p IVF  c/w PPI IV BID  refusing labs  refused colonoscopy

## 2022-06-24 NOTE — PROGRESS NOTE ADULT - PROBLEM SELECTOR PLAN 2
Chronic cardoso  proteus mirabilis ESBL- sensitive to Meropenem  afebrile, no leukocytosis  Continue Meropenem until 6/28  ID Dr. Schaefer.

## 2022-06-24 NOTE — PROGRESS NOTE ADULT - ASSESSMENT
81 year old female from home with HHA, with PMHx of CVA, HTN, HLD, SBO and renal insufficiency and chronic Perez who presented to the ED with c/o vomiting, black stool ( no AC) and cough 6/15/22. Admitted for LIAM and hyperkalemia. Found to be COVID positive with transaminitis, no indication for steroids/ no remdesivir due to renal failure. Admission labs notable for Creat 6, K 6.3, bicarb 8, pH 7.04 started on bicarb gtt and hyperkalemia cocktail with nephrology following pt refused HD, Hgb 9.1 with concern for GI Bleed due to occult positive stool. colonoscopy was refused with plan to focus on conservative measures and she was kept NPO with protonix gtt initiated, found to have UTI with concern for pyelonephritis due to flank pain for which zosyn was initiated. Pt transferred to SCU 6/17 with course notable for advanced diet and no further lab draws per patient request, ID following with antibiotics adjusted to meropenem with plan for 10 day course to end 6/28/22. DISPO plan home with hospice

## 2022-06-24 NOTE — PROGRESS NOTE ADULT - SUBJECTIVE AND OBJECTIVE BOX
`Patient is a 82y old  Female who presents with a chief complaint of renal failure (23 Jun 2022 16:48)      INTERVAL HPI/OVERNIGHT EVENTS: no overnight events      I&O's Summary    23 Jun 2022 07:01  -  24 Jun 2022 07:00  --------------------------------------------------------  IN: 0 mL / OUT: 850 mL / NET: -850 mL      Vital Signs Last 24 Hrs  T(C): 36.6 (24 Jun 2022 14:00), Max: 37.9 (23 Jun 2022 21:11)  T(F): 97.8 (24 Jun 2022 14:00), Max: 100.2 (23 Jun 2022 21:11)  HR: 85 (24 Jun 2022 14:00) (74 - 93)  BP: 137/60 (24 Jun 2022 14:00) (128/42 - 137/60)  BP(mean): --  RR: 16 (24 Jun 2022 14:00) (16 - 18)  SpO2: 98% (24 Jun 2022 14:00) (97% - 100%)  PAST MEDICAL & SURGICAL HISTORY:  HTN (hypertension)      CVA (cerebrovascular accident)      CRI (chronic renal insufficiency)      SBO (small bowel obstruction)      No significant past surgical history          SOCIAL HISTORY  Alcohol:  Tobacco:  Illicit substance use:      FAMILY HISTORY:      LABS:              CAPILLARY BLOOD GLUCOSE                MEDICATIONS  (STANDING):  meropenem  IVPB 500 milliGRAM(s) IV Intermittent every 24 hours  pantoprazole  Injectable 40 milliGRAM(s) IV Push two times a day    MEDICATIONS  (PRN):  acetaminophen     Tablet .. 650 milliGRAM(s) Oral every 6 hours PRN Mild Pain (1 - 3)      REVIEW OF SYSTEMS:  CONSTITUTIONAL: No fever  EYES: No eye pain, visual disturbances  ENMT:  No difficulty hearing, No sinus or throat pain  NECK: No pain or stiffness  RESPIRATORY: No cough, No shortness of breath  CARDIOVASCULAR: No chest pain, palpitations, dizziness, or leg swelling  GASTROINTESTINAL: No abdominal pain. No nausea, vomiting.  GENITOURINARY: No dysuria  NEUROLOGICAL: No headaches  SKIN: No rashes  MUSCULOSKELETAL: No joint pain or swelling      RADIOLOGY & ADDITIONAL TESTS:< from: Xray Chest 1 View- PORTABLE-Urgent (06.15.22 @ 21:01) >    ACC: [id number] EXAM:  XR CHEST PORTABLE URGENT 1V                          PROCEDURE DATE:  06/15/2022          INTERPRETATION:  AP semierect chest on Pinky 15, 2022 at 8:49 PM. Patient   has hematuria and weakness.    COMPARISON: None available.    Elevated diaphragms noted.    Heart magnified by technique.    Lungs are clear.    IMPRESSION: No acute finding.    --- End of Report ---            LUZMA HUBBARD MD; Attending Radiologist  This document has been electronically signed. Jun 16 2022 10:31AM    < end of copied text >      Imaging Personally Reviewed:  [x ] YES  [ ] NO    Consultant(s) Notes Reviewed:  [x ] YES  [ ] NO    PHYSICAL EXAM:  GENERAL: NAD  HEAD:  Atraumatic, Normocephalic  EYES: conjunctiva and sclera clear  ENMT:  Moist mucous membranes  NECK: Supple, No JVD  NERVOUS SYSTEM:  Alert & Oriented X3  CHEST/LUNG: Clear to auscultation bilaterally  HEART: Regular rate and rhythm  ABDOMEN: Soft, Nontender, Nondistended; Bowel sounds present  EXTREMITIES:  2+ Peripheral Pulses, No clubbing, cyanosis, or edema  SKIN: No rashes    Care Collaborated Discussed with Consultants/Other Providers [x ] YES  [ ] NO

## 2022-06-24 NOTE — PROGRESS NOTE ADULT - SUBJECTIVE AND OBJECTIVE BOX
MAXIMINO MASCORRO  82y  Patient is a 82y old  Female who presents with a chief complaint of renal failure (24 Jun 2022 14:25)    HPI:  Now on Hospice Care. No labs.    HEALTH ISSUES - PROBLEM Dx:  Acute renal failure    Debility    Palliative care encounter    COVID    GIB (gastrointestinal bleeding)    Anemia secondary to renal failure    Pyelonephritis    Transaminitis    Advance care planning    Severe protein-calorie malnutrition    ESRD (end stage renal disease)    2019 novel coronavirus disease (COVID-19)    Discharge planning issues          MEDICATIONS  (STANDING):  meropenem  IVPB 500 milliGRAM(s) IV Intermittent every 24 hours  pantoprazole  Injectable 40 milliGRAM(s) IV Push two times a day    MEDICATIONS  (PRN):  acetaminophen     Tablet .. 650 milliGRAM(s) Oral every 6 hours PRN Mild Pain (1 - 3)    Vital Signs Last 24 Hrs  T(C): 36.6 (24 Jun 2022 14:00), Max: 37.9 (23 Jun 2022 21:11)  T(F): 97.8 (24 Jun 2022 14:00), Max: 100.2 (23 Jun 2022 21:11)  HR: 85 (24 Jun 2022 14:00) (74 - 93)  BP: 137/60 (24 Jun 2022 14:00) (128/42 - 137/60)  BP(mean): --  RR: 16 (24 Jun 2022 14:00) (16 - 18)  SpO2: 98% (24 Jun 2022 14:00) (97% - 100%)  Daily     Daily     PHYSICAL EXAM:  Constitutional:  She appears comfortable and not distressed. Not diaphoretic.    Neck:  The thyroid is normal. Trachea is midline.     Respiratory: The lungs are clear to auscultation. No dullness and expansion is normal.    Cardiovascular: S1 and S2 are normal. No mummurs, rubs or gallops are present.    Gastrointestinal: The abdomen is soft. No tenderness is present. No masses are present. Bowel sounds are normal.    Genitourinary: The bladder is not distended. No CVA tenderness is present. Perez in place.    Extremities: No edema is noted. No deformities are present.    Neurological: Cognition is normal. Tone, power and sensation are normal. Gait is steady.    Skin: No lesions are seen  or palpated.    Lymph Nodes: No lymphadenopathy is present.

## 2022-06-24 NOTE — PROGRESS NOTE ADULT - PROBLEM SELECTOR PLAN 5
p/w elevated lipase  Tbili WNL  LFT's downtrending  Advance diet as tolerated.   Avoid hepatotoxic agents  DNR/DNI /conservative management BMI 22.2  Tolerating renal diet with 1liter per day fluid restriction.

## 2022-06-25 PROCEDURE — 99232 SBSQ HOSP IP/OBS MODERATE 35: CPT

## 2022-06-25 RX ORDER — LOPERAMIDE HCL 2 MG
2 TABLET ORAL ONCE
Refills: 0 | Status: COMPLETED | OUTPATIENT
Start: 2022-06-25 | End: 2022-06-25

## 2022-06-25 RX ORDER — ACETAMINOPHEN 500 MG
650 TABLET ORAL EVERY 6 HOURS
Refills: 0 | Status: DISCONTINUED | OUTPATIENT
Start: 2022-06-25 | End: 2022-06-29

## 2022-06-25 RX ADMIN — PANTOPRAZOLE SODIUM 40 MILLIGRAM(S): 20 TABLET, DELAYED RELEASE ORAL at 17:34

## 2022-06-25 RX ADMIN — Medication 650 MILLIGRAM(S): at 22:18

## 2022-06-25 RX ADMIN — PANTOPRAZOLE SODIUM 40 MILLIGRAM(S): 20 TABLET, DELAYED RELEASE ORAL at 05:26

## 2022-06-25 RX ADMIN — Medication 650 MILLIGRAM(S): at 23:00

## 2022-06-25 RX ADMIN — Medication 2 MILLIGRAM(S): at 12:54

## 2022-06-25 RX ADMIN — MEROPENEM 100 MILLIGRAM(S): 1 INJECTION INTRAVENOUS at 17:34

## 2022-06-25 NOTE — DISCHARGE NOTE PROVIDER - HOSPITAL COURSE
81 year old female from home with HHA, with PMHx of CVA, HTN, HLD, SBO and renal insufficiency and chronic Perez who presented to the ED with c/o vomiting, black stool ( no AC) and cough 6/15/22. Admitted for LIAM and hyperkalemia. Found to be COVID positive with transaminitis, no indication for steroids/ no remdesivir due to renal failure. Admission labs notable for Creat 6, K 6.3, bicarb 8, pH 7.04 started on bicarb gtt and hyperkalemia cocktail with nephrology following pt refused HD, Hgb 9.1 with concern for GI Bleed due to occult positive stool. colonoscopy was refused with plan to focus on conservative measures and she was kept NPO with protonix gtt initiated, found to have UTI with concern for pyelonephritis due to flank pain for which zosyn was initiated. Pt transferred to SCU 6/17 with course notable for advanced diet and no further lab draws per patient request, ID following with antibiotics adjusted to meropenem with plan for 10 day course to end 6/28/22. Plan was made to discharge patient home with home hospice.     INCOMPLETE   81 year old female from home with HHA, with PMHx of CVA, HTN, HLD, SBO and renal insufficiency and chronic Perez who presented to the ED with c/o vomiting, black stool ( no AC) and cough 6/15/22. Admitted for LIAM and hyperkalemia. Found to be COVID positive with transaminitis, no indication for steroids/ no remdesivir due to renal failure. Admission labs notable for Creat 6, K 6.3, bicarb 8, pH 7.04 started on bicarb gtt and hyperkalemia cocktail with nephrology following pt refused HD, Hgb 9.1 with concern for GI Bleed due to occult positive stool. colonoscopy was refused with plan to focus on conservative measures and she was kept NPO with protonix gtt initiated, found to have UTI with concern for pyelonephritis due to flank pain for which zosyn was initiated. Pt transferred to SCU 6/17 with course notable for advanced diet and no further lab draws per patient request, ID following with antibiotics adjusted to meropenem with plan for 10 day course to end 6/28/22. Plan was made to discharge patient home with home hospice.      81 year old female from home with HHA, with PMHx of CVA, HTN, HLD, SBO and renal insufficiency and chronic Perez who presented to the ED with c/o vomiting, black stool ( no AC) and cough 6/15/22. Admitted for LIAM and hyperkalemia. Found to be COVID positive with transaminitis, no indication for steroids/ no remdesivir due to renal failure. Admission labs notable for Creat 6, K 6.3, bicarb 8, pH 7.04 started on bicarb gtt and hyperkalemia cocktail with nephrology following pt refused HD, Hgb 9.1 with concern for GI Bleed due to occult positive stool. colonoscopy was refused with plan to focus on conservative measures and she was kept NPO with protonix gtt initiated, found to have UTI with concern for pyelonephritis due to flank pain for which zosyn was initiated. Pt transferred to SCU 6/17 with course notable for advanced diet and no further lab draws per patient request, ID following with antibiotics adjusted to meropenem with plan for 10 day course to end 6/28/22 for ESBL pyelonephritis. Plan was made to discharge patient home with home hospice as patient was in renal failure but did not wish for hemodialysis. She is stable for [place] home with hospice services.

## 2022-06-25 NOTE — DISCHARGE NOTE PROVIDER - NSDCCPCAREPLAN_GEN_ALL_CORE_FT
PRINCIPAL DISCHARGE DIAGNOSIS  Diagnosis: Acute renal failure  Assessment and Plan of Treatment: You presented with Cr 8.28, BUN 93, bicarb 8, pH 7.04  s/p bicarb drip. Nephrologist consulted . You were recommended Hemodialysis which was refused  DNR/DNI no invasive or aggressive measures, no central lines  Palliative following  Fluid restriction 1liter/day, renal diet  Plan is Home with home hospice        SECONDARY DISCHARGE DIAGNOSES  Diagnosis: LIAM (acute kidney injury)  Assessment and Plan of Treatment:     Diagnosis: Acute hyperkalemia  Assessment and Plan of Treatment:     Diagnosis: Increased anion gap metabolic acidosis  Assessment and Plan of Treatment:     Diagnosis: 2019 novel coronavirus disease (COVID-19)  Assessment and Plan of Treatment:     Diagnosis: DNR (do not resuscitate) discussion  Assessment and Plan of Treatment:     Diagnosis: Protein calorie malnutrition  Assessment and Plan of Treatment:      PRINCIPAL DISCHARGE DIAGNOSIS  Diagnosis: Acute renal failure  Assessment and Plan of Treatment: You presented with Cr 8.28, BUN 93, bicarb 8, pH 7.04  s/p bicarb drip. Nephrologist consulted . You were recommended Hemodialysis which was refused  DNR/DNI no invasive or aggressive measures, no central lines  Palliative following  Fluid restriction 1liter/day, renal diet  Plan is Home with home hospice        SECONDARY DISCHARGE DIAGNOSES  Diagnosis: LIAM (acute kidney injury)  Assessment and Plan of Treatment: Nephrology followed, you refused dialysis and opted for hospice at home.    Diagnosis: Acute hyperkalemia  Assessment and Plan of Treatment:     Diagnosis: Increased anion gap metabolic acidosis  Assessment and Plan of Treatment:     Diagnosis: 2019 novel coronavirus disease (COVID-19)  Assessment and Plan of Treatment:     Diagnosis: DNR (do not resuscitate) discussion  Assessment and Plan of Treatment:     Diagnosis: Protein calorie malnutrition  Assessment and Plan of Treatment:

## 2022-06-25 NOTE — PROGRESS NOTE ADULT - SUBJECTIVE AND OBJECTIVE BOX
[facility] Medicine  Patient is a 82y old  Female who presents with a chief complaint of renal failure (24 Jun 2022 20:09)      SUBJECTIVE / OVERNIGHT EVENTS:  Patient with diarrhea and lower abdominal pain likely in setting of IV antibiotics.  Denies any fevers/chills, headache, CP, SOB, N/V, constipation, or leg swelling.       MEDICATIONS  (STANDING):  loperamide 2 milliGRAM(s) Oral once  meropenem  IVPB 500 milliGRAM(s) IV Intermittent every 24 hours  pantoprazole  Injectable 40 milliGRAM(s) IV Push two times a day    MEDICATIONS  (PRN):  acetaminophen     Tablet .. 650 milliGRAM(s) Oral every 6 hours PRN Mild Pain (1 - 3)          OBJECTIVE:  Vital Signs Last 24 Hrs  T(C): 37.4 (25 Jun 2022 05:15), Max: 37.4 (25 Jun 2022 05:15)  T(F): 99.3 (25 Jun 2022 05:15), Max: 99.3 (25 Jun 2022 05:15)  HR: 89 (25 Jun 2022 05:15) (85 - 89)  BP: 131/48 (25 Jun 2022 05:15) (127/52 - 137/60)  BP(mean): --  RR: 18 (25 Jun 2022 05:15) (16 - 18)  SpO2: 98% (25 Jun 2022 05:15) (98% - 99%)    PHYSICAL EXAM:  GENERAL: NAD  HEAD:  Atraumatic, Normocephalic  EYES: conjunctiva and sclera clear  ENMT:  Moist mucous membranes  NECK: Supple, No JVD  NERVOUS SYSTEM:  Alert & Oriented X3  CHEST/LUNG: Clear to auscultation bilaterally  HEART: Regular rate and rhythm  ABDOMEN: Soft, Nontender, Nondistended; Bowel sounds present  EXTREMITIES:  2+ Peripheral Pulses, No clubbing, cyanosis, or edema  SKIN: No rashes      CAPILLARY BLOOD GLUCOSE        I&O's Summary    24 Jun 2022 07:01  -  25 Jun 2022 07:00  --------------------------------------------------------  IN: 0 mL / OUT: 400 mL / NET: -400 mL              LABS:                        RADIOLOGY & ADDITIONAL TESTS:

## 2022-06-25 NOTE — PROGRESS NOTE ADULT - ASSESSMENT
81 year old female with PMHx of CVA, HTN, HLD, SBO and renal insufficiency and chronic Perez who presented to the ED with c/o vomiting, black stool and cough 6/15/22. Admitted for Acute on chronic renal failure and hyperkalemia.     #Acute on chronic Renal failure  #ESBL Proteus pyelonephritis  #GIB  #Anemia 2/2 renal failure  #COVID  #Severe protein calorie malnutrition  #Transaminitis  Found to be COVID positive with transaminitis, no indication for steroids/ no remdesivir due to renal failure. Patient refused HD, Hgb 9.1 with concern for GI Bleed due to occult positive stool. colonoscopy was refused with plan to focus on conservative measures. Pt transferred to SCU 6/17 with course notable for advanced diet and no further lab draws per patient request, ID following with antibiotics adjusted to meropenem with plan for 10 day course to end 6/28/22. DISPO plan home with hospice  - Continue Meropenem  - immodium for diarrhea - likely 2/2 abx  - PPI IV BID  - On RA  - DNR/DNI, pending home hospice.

## 2022-06-25 NOTE — DISCHARGE NOTE PROVIDER - ATTENDING DISCHARGE PHYSICAL EXAMINATION:
Patient seen and examined prior to discharge. She reports feeling alright and is excited to be going home. Denies any flank pain, nausea, vomiting, abdominal pain, diarrhea or other issues.  PHYSICAL EXAM:  GENERAL: NAD, on room air, frail  HEAD:  Atraumatic, Normocephalic  EYES:  conjunctiva and sclera clear  NECK: Supple, No JVD  CHEST/LUNG: Clear to auscultation bilaterally; No wheeze, rhonchi, rales  HEART: Regular rate and rhythm; No murmurs, rubs, or gallops  ABDOMEN: Soft, Nontender, Nondistended; Bowel sounds present  EXTREMITIES:  2+ Peripheral Pulses, No clubbing, cyanosis, or edema,  PSYCH: AAOx3, calm, cooperative  NEUROLOGY: non-focal  SKIN: No rashes or lesions, hyperpigmentation of the upper and lower extremities bilaterally

## 2022-06-26 PROCEDURE — 99232 SBSQ HOSP IP/OBS MODERATE 35: CPT

## 2022-06-26 RX ADMIN — PANTOPRAZOLE SODIUM 40 MILLIGRAM(S): 20 TABLET, DELAYED RELEASE ORAL at 17:42

## 2022-06-26 RX ADMIN — PANTOPRAZOLE SODIUM 40 MILLIGRAM(S): 20 TABLET, DELAYED RELEASE ORAL at 06:20

## 2022-06-26 RX ADMIN — MEROPENEM 100 MILLIGRAM(S): 1 INJECTION INTRAVENOUS at 17:42

## 2022-06-26 NOTE — PROGRESS NOTE ADULT - SUBJECTIVE AND OBJECTIVE BOX
[facility] Medicine  Patient is a 82y old  Female who presents with a chief complaint of renal failure (24 Jun 2022 20:09)      SUBJECTIVE / OVERNIGHT EVENTS:  Patient with some diarrhea. Patient febrile overnight. Patient otherwise  denies any chills, headache, CP, SOB, N/V, constipation, or leg swelling.       MEDICATIONS  (STANDING):  loperamide 2 milliGRAM(s) Oral once  meropenem  IVPB 500 milliGRAM(s) IV Intermittent every 24 hours  pantoprazole  Injectable 40 milliGRAM(s) IV Push two times a day    MEDICATIONS  (PRN):  acetaminophen     Tablet .. 650 milliGRAM(s) Oral every 6 hours PRN Mild Pain (1 - 3)          OBJECTIVE:  Vital Signs Last 24 Hrs  T(C): 37.4 (25 Jun 2022 05:15), Max: 37.4 (25 Jun 2022 05:15)  T(F): 99.3 (25 Jun 2022 05:15), Max: 99.3 (25 Jun 2022 05:15)  HR: 89 (25 Jun 2022 05:15) (85 - 89)  BP: 131/48 (25 Jun 2022 05:15) (127/52 - 137/60)  BP(mean): --  RR: 18 (25 Jun 2022 05:15) (16 - 18)  SpO2: 98% (25 Jun 2022 05:15) (98% - 99%)    PHYSICAL EXAM:  GENERAL: NAD  HEAD:  Atraumatic, Normocephalic  EYES: conjunctiva and sclera clear  ENMT:  Moist mucous membranes  NECK: Supple, No JVD  NERVOUS SYSTEM:  Alert & Oriented X3  CHEST/LUNG: Clear to auscultation bilaterally  HEART: Regular rate and rhythm  ABDOMEN: Soft, Nontender, Nondistended; Bowel sounds present  EXTREMITIES:  2+ Peripheral Pulses, No clubbing, cyanosis, or edema  SKIN: No rashes      CAPILLARY BLOOD GLUCOSE        I&O's Summary    24 Jun 2022 07:01  -  25 Jun 2022 07:00  --------------------------------------------------------  IN: 0 mL / OUT: 400 mL / NET: -400 mL              LABS:                        RADIOLOGY & ADDITIONAL TESTS:

## 2022-06-26 NOTE — CHART NOTE - NSCHARTNOTEFT_GEN_A_CORE
EVENT:   6/25/22, 10:20 pm, patient with COVID19 (+)-6/15/22 disease, UTI, on Meropenem IV, had T - 100.5F, HR - 97, BP - 117/50, RR - 20, O2sat - 100%RA.   HPI:     80 y/o female from home with HHA, with PMH of CVA, HTN, HLD, SBO and renal insufficiency in the past, later improved, chronic Perez? presents to the ED with c/o vomiting. As per son Reid ([phone]), patient has been having vomiting x 2 days- non-bloody and white in color. She also has been having diarrhea x 2 days, cough with phlegm but no fever. He also reports her urine being much darker but very little in amount. Patient had ESRD in the past and was offered HD but refused and was discharged home with home hospice. However, she got better and was taken off hospice. Patient remains DNR/ DNI/ no hemodialysis or central line.  (15 Hossein 2022 23:02)  OBJECTIVE:  Vital Signs Last 24 Hrs  T(C): 37.7 (25 Jun 2022 23:01), Max: 38.1 (25 Jun 2022 21:46)  T(F): 99.8 (25 Jun 2022 23:01), Max: 100.5 (25 Jun 2022 21:46)  HR: 97 (25 Jun 2022 21:46) (83 - 97)  BP: 117/50 (25 Jun 2022 21:46) (117/50 - 120/60)  BP(mean): --  RR: 20 (25 Jun 2022 21:46) (18 - 20)  SpO2: 100% (25 Jun 2022 21:46) (100% - 100%)      PLAN:   - Acetaminophen (Tylenol) 650 mg po Q6hrs PRN for Temp. > 100.4F.     FOLLOW UP / RESULT:   - Reassess patient VS,   - Maintain safety measures.

## 2022-06-26 NOTE — PROGRESS NOTE ADULT - ASSESSMENT
81 year old female with PMHx of CVA, HTN, HLD, SBO and renal insufficiency and chronic Perez who presented to the ED with c/o vomiting, black stool and cough 6/15/22. Admitted for Acute on chronic renal failure and hyperkalemia.     #Acute on chronic Renal failure  #ESBL Proteus pyelonephritis  #GIB  #Anemia 2/2 renal failure  #COVID  #Severe protein calorie malnutrition  #Transaminitis  Found to be COVID positive with transaminitis, no indication for steroids/ no remdesivir due to renal failure. Patient refused HD, Hgb 9.1 with concern for GI Bleed due to occult positive stool. colonoscopy was refused with plan to focus on conservative measures. Pt transferred to SCU 6/17 with course notable for advanced diet and no further lab draws per patient request, ID following with antibiotics adjusted to meropenem with plan for 10 day course to end 6/28/22. DISPO plan home with hospice  - Continue Meropenem  - continue immodium  - PPI IV BID  - On RA  - DNR/DNI, pending home hospice.

## 2022-06-27 DIAGNOSIS — Z29.9 ENCOUNTER FOR PROPHYLACTIC MEASURES, UNSPECIFIED: ICD-10-CM

## 2022-06-27 LAB — SARS-COV-2 RNA SPEC QL NAA+PROBE: DETECTED

## 2022-06-27 PROCEDURE — 99232 SBSQ HOSP IP/OBS MODERATE 35: CPT

## 2022-06-27 RX ADMIN — MEROPENEM 100 MILLIGRAM(S): 1 INJECTION INTRAVENOUS at 17:16

## 2022-06-27 RX ADMIN — PANTOPRAZOLE SODIUM 40 MILLIGRAM(S): 20 TABLET, DELAYED RELEASE ORAL at 17:16

## 2022-06-27 RX ADMIN — PANTOPRAZOLE SODIUM 40 MILLIGRAM(S): 20 TABLET, DELAYED RELEASE ORAL at 06:31

## 2022-06-27 NOTE — PROGRESS NOTE ADULT - PROBLEM SELECTOR PLAN 4
Currently maintaining oxygen saturation on RA.  Not a candidate for Remedesivir secondary to renal failure.  Monitor oxygen saturation.  Continue supportive measures. - on room air, no indication for steroids  - Not a candidate for Remdesivir 2/2 to renal failure  - Monitor oxygen saturation  - continue supportive measures

## 2022-06-27 NOTE — PROGRESS NOTE ADULT - PROBLEM SELECTOR PLAN 5
BMI 22.2  Tolerating renal diet with 1liter per day fluid restriction. - likely 2/2 *_*_*_**_*_**_*_*_  - LFT's downtrending  - Tbili WNL  - Avoid hepatotoxic agents - DVT ppx: SCD  - GI ppx: Protonix BID

## 2022-06-27 NOTE — PROGRESS NOTE ADULT - PROBLEM SELECTOR PLAN 2
Chronic cardoso  proteus mirabilis ESBL- sensitive to Meropenem  afebrile, no leukocytosis  Continue Meropenem until 6/28  ID Dr. Schaefer. - Chronic cardoso  - afebrile, no leukocytosis  - urine cx positive for Proteus mirabilis ESBL  - Continue Meropenem until 6/28  - ID Dr. Schaefer following - urine cx positive for Proteus mirabilis ESBL  - Chronic cardoso  - afebrile, no leukocytosis  - Continue Meropenem until 6/28  - ID Dr. Schaefer following

## 2022-06-27 NOTE — PROGRESS NOTE ADULT - PROBLEM SELECTOR PLAN 6
No outward sign of bleeding.  No pharmacologic DVT prophylaxis - DVT ppx: SCD  - GI ppx: Protonix BID - patient from home  - IV abx until 6/28/22  - home w/ home hospice after abx course  - FANTASMA meng

## 2022-06-27 NOTE — PROGRESS NOTE ADULT - SUBJECTIVE AND OBJECTIVE BOX
NP Note discussed with  Primary Attending    Patient is a 82y old  Female who presents with a chief complaint of renal failure (26 Jun 2022 11:12)      INTERVAL HPI/OVERNIGHT EVENTS: no new complaints; patient states she feels much better    MEDICATIONS  (STANDING):  meropenem  IVPB 500 milliGRAM(s) IV Intermittent every 24 hours  pantoprazole  Injectable 40 milliGRAM(s) IV Push two times a day    MEDICATIONS  (PRN):  acetaminophen     Tablet .. 650 milliGRAM(s) Oral every 6 hours PRN Temp greater or equal to 38C (100.4F), Mild Pain (1 - 3)      __________________________________________________  REVIEW OF SYSTEMS:    CONSTITUTIONAL: No fever,   EYES: no acute visual disturbances  NECK: No pain or stiffness  RESPIRATORY: No cough; No shortness of breath  CARDIOVASCULAR: No chest pain, no palpitations  GASTROINTESTINAL: No pain. No nausea or vomiting; No diarrhea   NEUROLOGICAL: No headache  MUSCULOSKELETAL: No joint pain, no muscle pain  GENITOURINARY: no dysuria, no frequency, no hesitancy  PSYCHIATRY: no depression , no anxiety  ALL OTHER  ROS negative        Vital Signs Last 24 Hrs  T(C): 37.4 (26 Jun 2022 21:29), Max: 37.4 (26 Jun 2022 21:29)  T(F): 99.4 (26 Jun 2022 21:29), Max: 99.4 (26 Jun 2022 21:29)  HR: 98 (26 Jun 2022 21:29) (84 - 98)  BP: 128/54 (26 Jun 2022 21:29) (125/45 - 128/54)  BP(mean): --  RR: 18 (26 Jun 2022 21:29) (18 - 18)  SpO2: 99% (26 Jun 2022 21:29) (99% - 100%)    ________________________________________________  PHYSICAL EXAM:  GENERAL: NAD  HEENT: Normocephalic;  conjunctivae and sclerae clear; + [place]  NECK : supple  CHEST/LUNG: Clear to auscultation bilaterally  HEART: S1 S2  regular; no murmurs,  ABDOMEN: Soft, Nontender, Nondistended; Bowel sounds present  EXTREMITIES: no cyanosis; no edema;   SKIN: warm and dry; no rash  NERVOUS SYSTEM:  Awake and alert; Oriented  to place, person and time ; no new deficits    _________________________________________________  LABS:              CAPILLARY BLOOD GLUCOSE            RADIOLOGY & ADDITIONAL TESTS:    < from: Xray Chest 1 View- PORTABLE-Urgent (06.15.22 @ 21:01) >  IMPRESSION: No acute finding.    < end of copied text >      Imaging Personally Reviewed:  YES        Care Discussed with Consultants :     Plan of care was discussed with patient and /or primary care giver; all questions and concerns were addressed and care was aligned with patient's wishes.

## 2022-06-27 NOTE — PROGRESS NOTE ADULT - PROBLEM SELECTOR PLAN 8
pending course of IV abx until 6/28/22 then d/c home with hospice.
Assist with all ADL's  Maintain safety measures.  Supportive measures.
pending course of IV abx until 6/28/22 then d/c home with hospice.
Pt previously on home hospice with [facility] , has 24hour  HHA  Pt is DNR/DNI / no HD/ no invasive or aggressive measures, no central line.   Rashi Mathews is agreeable fo referral to home hospice again once medically stabilized.   Will hold off on further diagnostic studies / labs   Palliative following.

## 2022-06-27 NOTE — PROGRESS NOTE ADULT - PROBLEM SELECTOR PLAN 3
p/w Hb 9.1, reports of dark stool  occult +  tolerating regular diet  s/p IVF  c/w PPI IV BID  refusing labs  refused colonoscopy - p/w Hb 9.1, reports of dark stool  - occult +  - tolerating regular diet  - c/w PPI IV BID  - refusing labs  - refused colonoscopy

## 2022-06-27 NOTE — PROGRESS NOTE ADULT - PROBLEM SELECTOR PLAN 7
p/w elevated lipase  Tbili WNL  LFT's downtrending  Advance diet as tolerated.   Avoid hepatotoxic agents  DNR/DNI /conservative management - patient from home  - IV abx until 6/28/22  - home w/ home hospice after abx course

## 2022-06-27 NOTE — PROGRESS NOTE ADULT - ASSESSMENT
81 year old female from home with HHA, with PMHx of CVA, HTN, HLD, SBO, renal insufficiency with chronic Perez who presented to the hospital with  vomiting, black stool, cough, and found to be COVID + with transaminitis , and Creat 6, K 6.3, bicarb 8, pH 7.04. Admitted to the ICU for acute renal failure and metabolic acidosis, s/p bicarb drip. Nephro consulted and patient refused HD. There was also c/f GIB given Hgb 9.1, family decided on conservative refusing colonoscopy. Hospital course c/b Pyelonephritis found to have UTI with concern for pyelonephritis due to flank pain for which zosyn was initiated. Pt transferred to SCU 6/17 with course notable for advanced diet and no further lab draws per patient request, ID following with antibiotics adjusted to meropenem with plan for 10 day course to end 6/28/22. DISPO plan home with hospice   81 year old female from home with HHA, with PMHx of CVA, HTN, HLD, SBO, renal insufficiency with chronic Perez who presented to the hospital with  vomiting, black stool, cough, and found to be COVID + with transaminitis , and Creat 6, K 6.3, bicarb 8, pH 7.04. Admitted to the ICU for acute renal failure and metabolic acidosis, s/p bicarb drip. Nephro consulted and patient refused HD. There was also concern for GIB given Hgb 9.1, family decided on conservative refusing colonoscopy. Hospital course c/b Pyelonephritis, urine cx positive for Proteus Mirabilis. ID consulted and started on Meropenem unitl 6/28/22. Palliative following and patient refusing further lab draws, discussed with son and both agree to home with home hospice.    81 year old female from home with HHA, with PMHx of CVA, HTN, HLD, SBO, renal insufficiency with chronic Perez who presented to the hospital with  vomiting, black stool, cough, and found to be COVID + with transaminitis , and Creat 6, K 6.3, bicarb 8, pH 7.04. Admitted to the ICU for acute renal failure and metabolic acidosis, s/p bicarb drip. Nephro consulted and patient refused HD. There was also concern for GIB given Hgb 9.1, family decided on conservative refusing colonoscopy. Hospital course c/b Pyelonephritis, urine cx positive for Proteus Mirabilis ESBL. ID Followed and started on Meropenem unitl 6/28/22. Palliative following and patient refusing further lab draws, discussed with son and both agree to home with home hospice.

## 2022-06-27 NOTE — PROGRESS NOTE ADULT - PROBLEM SELECTOR PLAN 1
Presented with Cr 8.28, BUN 93  s/p bicarb drip  Pt does not want HD  DNR/DNI no invasive or aggressive measures, no central lines  Palliative following  Fluid restriction 1liter/day, renal diet.  Currently refusing any lab work. - Presented with Cr 8.28, BUN 93  - s/p bicarb drip  - Refusing HD  - DNR/DNI no invasive or aggressive measures, no central lines  - Palliative following  - Fluid restriction 1liter/day, renal diet  - Nephro Dr. Coyle following

## 2022-06-28 PROCEDURE — 99233 SBSQ HOSP IP/OBS HIGH 50: CPT | Mod: FS

## 2022-06-28 RX ORDER — PANTOPRAZOLE SODIUM 20 MG/1
40 TABLET, DELAYED RELEASE ORAL
Refills: 0 | Status: DISCONTINUED | OUTPATIENT
Start: 2022-06-28 | End: 2022-06-29

## 2022-06-28 RX ADMIN — PANTOPRAZOLE SODIUM 40 MILLIGRAM(S): 20 TABLET, DELAYED RELEASE ORAL at 18:42

## 2022-06-28 RX ADMIN — PANTOPRAZOLE SODIUM 40 MILLIGRAM(S): 20 TABLET, DELAYED RELEASE ORAL at 05:12

## 2022-06-28 NOTE — PROGRESS NOTE ADULT - PROVIDER SPECIALTY LIST ADULT
Nephrology
Critical Care
Nephrology
Nephrology
Critical Care
Infectious Disease
Nephrology
Critical Care
Internal Medicine
Internal Medicine
Nephrology
Critical Care
Internal Medicine

## 2022-06-28 NOTE — PROGRESS NOTE ADULT - PROBLEM SELECTOR PROBLEM 2
ESRD (end stage renal disease)
Pyelonephritis
ESRD (end stage renal disease)
Pyelonephritis

## 2022-06-28 NOTE — PROGRESS NOTE ADULT - SUBJECTIVE AND OBJECTIVE BOX
MAXIMINO MASCORRO  82y  Patient is a 82y old  Female who presents with a chief complaint of renal failure (28 Jun 2022 11:46)    HPI:  ESRD but not on HD. On Palliative CKD Care.    HEALTH ISSUES - PROBLEM Dx:  Acute renal failure    Debility    Palliative care encounter    COVID    GIB (gastrointestinal bleeding)    Anemia secondary to renal failure    Pyelonephritis    Transaminitis    Advance care planning    Severe protein-calorie malnutrition    ESRD (end stage renal disease)    2019 novel coronavirus disease (COVID-19)    Discharge planning issues    Prophylactic measure          MEDICATIONS  (STANDING):  pantoprazole    Tablet 40 milliGRAM(s) Oral two times a day    MEDICATIONS  (PRN):  acetaminophen     Tablet .. 650 milliGRAM(s) Oral every 6 hours PRN Temp greater or equal to 38C (100.4F), Mild Pain (1 - 3)    Vital Signs Last 24 Hrs  T(C): 36.8 (28 Jun 2022 14:19), Max: 37 (27 Jun 2022 20:45)  T(F): 98.2 (28 Jun 2022 14:19), Max: 98.6 (27 Jun 2022 20:45)  HR: 80 (28 Jun 2022 14:19) (80 - 87)  BP: 133/56 (28 Jun 2022 14:19) (130/53 - 140/42)  BP(mean): --  RR: 16 (28 Jun 2022 14:19) (16 - 18)  SpO2: 100% (28 Jun 2022 14:19) (92% - 100%)  Daily     Daily     PHYSICAL EXAM:  Constitutional:  She appears comfortable and not distressed. Not diaphoretic.    Neck:  The thyroid is normal. Trachea is midline.     Respiratory: The lungs are clear to auscultation. No dullness and expansion is normal.    Cardiovascular: S1 and S2 are normal. No mummurs, rubs or gallops are present.    Gastrointestinal: The abdomen is soft. No tenderness is present. No masses are present. Bowel sounds are normal.    Genitourinary: The bladder is not distended. No CVA tenderness is present.    Extremities: No edema is noted. No deformities are present.    Neurological: Cognition is normal. Tone, power and sensation are normal. Gait is steady.    Skin: No lesions are seen  or palpated.    Lymph Nodes: No lymphadenopathy is present.    Psychiatric: Mood is appropriate. No hallucinations or flight of ideas are noted.

## 2022-06-28 NOTE — PROGRESS NOTE ADULT - NS ATTEND AMEND GEN_ALL_CORE FT
Problem/Plan - 1:  ·  Problem: 2019 novel coronavirus disease (COVID-19).   ·  Plan: Currently maintaining oxygen saturation on RA.  Not a candidate for Remedesivir secondary to renal failure.  Monitor oxygen saturation.  Continue supportive measures.     Problem/Plan - 2:  ·  Problem: ESRD (end stage renal disease).   ·  Plan: Presented with Cr 8.28, BUN 93, bicarb 8, pH 7.04  s/p bicarb drip  CO2 normalized  Pt does not want HD  DNR/DNI no invasive or aggressive measures, no central lines  Palliative following  Fluid restriction 1liter/day, renal diet.  Currently refusing any lab work.     Problem/Plan - 3:  ·  Problem: Pyelonephritis.   ·  Plan: +Chronic cardoso  proteus mirabilis ESBL- sensitive to Meropenem  afebrile, no leukocytosis  Continue Meropenem until 6/28  ID Dr. Schaefer.     Problem/Plan - 4:  ·  Problem: Anemia secondary to renal failure.   ·  Plan: Patient refusing any more labs.  No outward sign of bleeding.  No pharmacologic DVT prophylaxis.     Problem/Plan - 5:  ·  Problem: Severe protein-calorie malnutrition.   ·  Plan: Continue conservative management as per wishes.  Tolerating renal diet with 1liter per day fluid restriction.     Problem/Plan - 6:  ·  Problem: Advance care planning.   ·  Plan: Pt previously on home hospice with [facility] , has 24hour  HHA  Pt is DNR/DNI / no HD/ no invasive or aggressive measures, no central line.  No further labs or tests- patient refusing  consider repeating BMP for son- requesting Creatinine level on day of discharge  Palliative following.  Patient stable for downgrade to Medicine.
Problem/Plan - 1:  ·  Problem: 2019 novel coronavirus disease (COVID-19).   ·  Plan: Currently maintaining oxygen saturation on RA.  Did not receive Remedesivir   secondary to renal failure.  Monitor oxygen saturation.  Continue supportive measures.  Pt does not want further lab work.     Problem/Plan - 2:  ·  Problem: ESRD (end stage renal disease).   ·  Plan: Presented with Cr 8.28, BUN 93, bicarb 8, pH 7.04  s/p bicarb drip  CO2 normalized  Pt does not want HD  DNR/DNI no invasive or aggressive measures, no central lines  Palliative following  Fluid restriction 1liter/day.     Problem/Plan - 3:  ·  Problem: Pyelonephritis.   ·  Plan: +Chronic cardoso  UC growing proteus mirabilis   afebrile, no leukocytosis  Continue Meropenem   F/u consult with ID Dr. Schaefer.     Problem/Plan - 4:  ·  Problem: Anemia secondary to renal failure.   ·  Plan: Secondary to chronic renal failure.  Patient refusing any more labs.  No outward sign of bleeding.  No pharmacologic DVT prophylaxis.     Problem/Plan - 5:  ·  Problem: Severe protein-calorie malnutrition.   ·  Plan: Continue conservative management as per wishes.  Tolerating renal diet with 1liter per day fluid restriction.     Problem/Plan - 6:  ·  Problem: Advance care planning.   ·  Plan: Pt previously on home hospice with [facility] , has 24hour  HHA  Pt is DNR/DNI / no HD/ no invasive or aggressive measures, no central line.   No further labs or tests as per patient's wishes.  Palliative following.
Problem/Plan - 1:  ·  Problem: 2019 novel coronavirus disease (COVID-19).   ·  Plan: Currently maintaining oxygen saturation on RA.  Not a candidate for Remedesivir secondary to renal failure.  Monitor oxygen saturation.  Continue supportive measures.  Pt does not want further lab work.     Problem/Plan - 2:  ·  Problem: ESRD (end stage renal disease).   ·  Plan: Presented with Cr 8.28, BUN 93, bicarb 8, pH 7.04  s/p bicarb drip  CO2 normalized  Pt does not want HD  DNR/DNI no invasive or aggressive measures, no central lines  Palliative following  Fluid restriction 1liter/day, renal diet.     Problem/Plan - 3:  ·  Problem: Pyelonephritis.   ·  Plan: +Chronic cardoso  UC growing proteus mirabilis  afebrile, no leukocytosis  Continue Meropenem  F/u consult with ID Dr. Schaefer  Plan to discuss if meropenem able to be administered at home.     Problem/Plan - 4:  ·  Problem: Anemia secondary to renal failure.   ·  Plan: Patient refusing any more labs.  No outward sign of bleeding.  No pharmacologic DVT prophylaxis.     Problem/Plan - 5:  ·  Problem: Severe protein-calorie malnutrition.   ·  Plan: Continue conservative management as per wishes.  Tolerating renal diet with 1liter per day fluid restriction.     Problem/Plan - 6:  ·  Problem: Advance care planning.   ·  Plan: Pt previously on home hospice with [facility] , has 24hour  HHA  Pt is DNR/DNI / no HD/ no invasive or aggressive measures, no central line.  No further labs or tests as per patient's wishes.  Palliative following.
Patient seen and examined. Case discussed with BEN Vlaverde. In brief, this is a 82 yo F with hx of CVA, HTN, HLD, SBO and renal insufficiency and chronic Perez who presented to the ED with c/o vomiting, black stool and cough 6/15/22. Admitted for Acute on chronic renal failure and hyperkalemia as well as ESBL Proteus mirabilis pyelonephritis of the L kidney. Subsequently also found to be COVID positive.    #Acute on chronic Renal failure  #ESBL Proteus mirabilis pyelonephritis  #GIB  #Anemia 2/2 renal failure  #COVID  #Severe protein calorie malnutrition  #Transaminitis  Found to be COVID positive with transaminitis, no indication for steroids/ no remdesivir due to renal failure. Patient refused HD, Hgb 9.1 with concern for GI Bleed due to occult positive stool. Colonoscopy was refused with plan to focus on conservative measures. Pt transferred to SCU 6/17 with course notable for advanced diet and no further lab draws per patient request, ID following with antibiotics adjusted to meropenem with plan for 10 day course to end on 6/27. DISPO plan home with hospice once patient is COVID positive x 14 days (6/29/22 as was first COVID positive on 6/15/22).  - Completed Meropenem course  - continue imodium  - PPI po BID  - On RA  - DNR/DNI, pending home hospice tomorrow as patient has been COVID positive x 14 days tomorrow
Problem/Plan - 1:  ·  Problem: 2019 novel coronavirus disease (COVID-19).   ·  Plan: Currently maintaining oxygen saturation on RA.  Not a candidate for Remedesivir secondary to renal failure.  Monitor oxygen saturation.  Continue supportive measures.     Problem/Plan - 2:  ·  Problem: ESRD (end stage renal disease).   ·  Plan: Presented with Cr 8.28, BUN 93, bicarb 8, pH 7.04  s/p bicarb drip  CO2 normalized  Pt does not want HD  DNR/DNI no invasive or aggressive measures, no central lines  Palliative following  Fluid restriction 1liter/day, renal diet.     Problem/Plan - 3:  ·  Problem: Pyelonephritis.   ·  Plan: +Chronic cardoso  proteus mirabilis ESBL- sensitive to Meropenem  afebrile, no leukocytosis  Continue Meropenem until 6/28  ID Dr. Schaefer.     Problem/Plan - 4:  ·  Problem: Anemia secondary to renal failure.   ·  Plan: Patient refusing any more labs.  No outward sign of bleeding.  No pharmacologic DVT prophylaxis.     Problem/Plan - 5:  ·  Problem: Severe protein-calorie malnutrition.   ·  Plan: Continue conservative management as per wishes.  Tolerating renal diet with 1liter per day fluid restriction.     Problem/Plan - 6:  ·  Problem: Advance care planning.   ·  Plan: Pt previously on home hospice with [facility] , has 24hour  HHA  Pt is DNR/DNI / no HD/ no invasive or aggressive measures, no central line.  No further labs or tests- patient refusing  consider repeating BMP for son- requesting Creatinine level on day of discharge  Palliative following.
Problem/Plan - 1:  ·  Problem: ESRD (end stage renal disease).   ·  Plan: p/w Cr 8.28, BUN 93, bicarb 8, pH 7.04  s/p bicarb drip  CO2 normalized  Pt does not want HD  DNR/DNI no invasive or aggressive measures, no central lines  Palliative following  Fluid restriction 1liter/day.     Problem/Plan - 2:  ·  Problem: Pyelonephritis.   ·  Plan: has chronic cardoso   UC growing proteus mirabilis   afebrile, no leukocytosis  Continue Meropenem   F/u consult with ID Dr. Schaefer.     Problem/Plan - 3:  ·  Problem: 2019 novel coronavirus disease (COVID-19).   ·  Plan: No remedesivir due to ESRD and transaminitis.   Oxygenating adequately on RA, %  Monitor oxygenation  Supportive measures.   Airborne/contact isolation.     Problem/Plan - 4:  ·  Problem: GIB (gastrointestinal bleeding).   ·  Plan: FOBT (+)  Hgb low, stable   Continue PPI  Advance diet as tolerated.     Problem/Plan - 5:  ·  Problem: Severe protein-calorie malnutrition.   ·  Plan: Advance diet soft   Fluid restriction 1liter/day  Nutrition following.     Problem/Plan - 6:  ·  Problem: Transaminitis.   ·  Plan: with elevated lipase  of unknown etiology  Tbili WNL  LFT's downtrending  Advance diet as tolerated.   Avoid hepatotoxic agents  DNR/DNI /conservative management.     Problem/Plan - 7:  ·  Problem: Anemia secondary to renal failure.   ·  Plan: Hgb low, stable  No blood transfusion per family  Hold pharmacologic DVT ppx.     Problem/Plan - 8:  ·  Problem: Debility.   ·  Plan: Assist with all ADL's  Maintain safety measures.  Supportive measures.     Problem/Plan - 9:  ·  Problem: Advance care planning.   ·  Plan: Pt previously on home hospice with [facility] , has 24hour  HHA  Pt is DNR/DNI / no HD/ no invasive or aggressive measures, no central line.   Rashi Mathews is agreeable fo referral to home hospice again once medically stabilized.   Will hold off on further diagnostic studies / labs   Palliative following.
Problem/Plan - 1:  ·  Problem: ESRD (end stage renal disease).   ·  Plan: p/w Cr 8.28, BUN 93, bicarb 8, pH 7.04  s/p bicarb drip  CO2 normalized  Pt does not want HD  DNR/DNI no invasive or aggressive measures, no central lines  Palliative following.     Problem/Plan - 2:  ·  Problem: Pyelonephritis.   ·  Plan: has chronic cardoso   UC growing proteus mirabilis   afebrile, no leukocytosis  D/c Zosyn .Change abx to Meropenem  ID Dr. Schaefer consulted.     Problem/Plan - 3:  ·  Problem: 2019 novel coronavirus disease (COVID-19).   ·  Plan: No remedesivir due to ESRD and transaminitis.   Oxygenating adequately on RA, %  Monitor oxygenation  Supportive measures.   Airborne/contact isolation.     Problem/Plan - 4:  ·  Problem: GIB (gastrointestinal bleeding).   ·  Plan: FOBT (+)  Hgb low, stable   Continue PPI  Advance to clear liquid.     Problem/Plan - 5:  ·  Problem: Severe protein-calorie malnutrition.   ·  Plan: Diet advanced to clear liquid  Nutrition following.     Problem/Plan - 6:  ·  Problem: Transaminitis.   ·  Plan: with elevated lipase  of unknown etiology  Tbili WNL  LFT's downtrending  Avoid hepatotoxic agents  DNR/DNI /conservative management.     Problem/Plan - 7:  ·  Problem: Anemia secondary to renal failure.   ·  Plan: Hgb low, stable  No blood transfusion per family  Hold pharmacologic DVT ppx.     Problem/Plan - 8:  ·  Problem: Advance care planning.   ·  Plan: Pt previously on home hospice with [facility] , has 24hour  HHA  Pt is DNR/DNI / no HD/ no invasive or aggressive measures, no central line.   Rashi Mathews is agreeable fo referral to home hospice again once medically stabilized.   Will hold off on further diagnostic studies / labs   Palliative following.
81 year old female with PMHx of CVA, HTN, HLD, SBO and renal insufficiency and chronic Perez who presented to the ED with c/o vomiting, black stool and cough 6/15/22. Admitted for Acute on chronic renal failure and hyperkalemia.     #Acute on chronic Renal failure  #ESBL Proteus pyelonephritis  #GIB  #Anemia 2/2 renal failure  #COVID  #Severe protein calorie malnutrition  #Transaminitis  Found to be COVID positive with transaminitis, no indication for steroids/ no remdesivir due to renal failure. Patient refused HD, Hgb 9.1 with concern for GI Bleed due to occult positive stool. colonoscopy was refused with plan to focus on conservative measures. Pt transferred to SCU 6/17 with course notable for advanced diet and no further lab draws per patient request, ID following with antibiotics adjusted to meropenem with plan for 10 day course to end 6/28/22. DISPO plan home with hospice  - Continue Meropenem  - PPI IV BID  - On RA  - DNR/DNI, pending home hospice
81 year old female with PMHx of CVA, HTN, HLD, SBO and renal insufficiency and chronic Perez who presented to the ED with c/o vomiting, black stool and cough 6/15/22. Admitted for Acute on chronic renal failure and hyperkalemia.     #Acute on chronic Renal failure  #ESBL Proteus pyelonephritis  #GIB  #Anemia 2/2 renal failure  #COVID  #Severe protein calorie malnutrition  #Transaminitis  Found to be COVID positive with transaminitis, no indication for steroids/ no remdesivir due to renal failure. Patient refused HD, Hgb 9.1 with concern for GI Bleed due to occult positive stool. colonoscopy was refused with plan to focus on conservative measures. Pt transferred to SCU 6/17 with course notable for advanced diet and no further lab draws per patient request, ID following with antibiotics adjusted to meropenem with plan for 10 day course to end 6/28/22. DISPO plan home with hospice  - Continue Meropenem  - continue immodium  - PPI IV BID  - On RA  - DNR/DNI, pending home hospice after completion of antibiotics 6/28

## 2022-06-28 NOTE — PROGRESS NOTE ADULT - REASON FOR ADMISSION
renal failure

## 2022-06-28 NOTE — CHART NOTE - NSCHARTNOTEFT_GEN_A_CORE
Assessment:   82yFemalePatient is a 82y old  Female who presents with a chief complaint of renal failure (28 Jun 2022 11:46) Pt Visited, Pt is alert and Verbal. Pt is [place].   Food choices updated. Pt likes Vegetarian soup. MD notes Noted. Pt with ARF.  Pt  / Family refusing HD. Pt is Followed by Palliative. Plan is for Home Hospice. Pt DG from ICU to 4 N 6/17.      Factors impacting intake: [ ] none [ ] nausea  [ ] vomiting [ ] diarrhea [ ] constipation  [ ]chewing problems [ ] swallowing issues  [x ] other: LIAM    Diet Prescription: Diet, Renal Restrictions:   For patients receiving Renal Replacement - No Protein Restr, No Conc K, No Conc Phos, Low Sodium  Soft and Bite Sized (SOFTBTSZ)  1000mL Fluid Restriction ([id number])  No Beef  No Pork  No Poultry (06-19-22 @ 18:28)    Intake: ~50-75 %    Current Weight:   % Weight Change   Bed scale 127 lb     Pertinent Medications: MEDICATIONS  (STANDING):  pantoprazole    Tablet 40 milliGRAM(s) Oral two times a day    MEDICATIONS  (PRN):  acetaminophen     Tablet .. 650 milliGRAM(s) Oral every 6 hours PRN Temp greater or equal to 38C (100.4F), Mild Pain (1 - 3)    Pertinent Labs:  06-15 Chol --    LDL --    HDL --    Trig 144 mg/dL     CAPILLARY BLOOD GLUCOSE        Skin:                 Interventions:   Recommend  [ ] Change Diet To:  [x ] Nutrition Supplement Nepro BID   [ ] Nutrition Support  [ ] Other:     Monitoring and Evaluation:   [ ] PO intake [ x ] Tolerance to diet prescription [ x ] weights [ x ] labs[ x ] follow up per protocol  [ ] other:

## 2022-06-28 NOTE — PROGRESS NOTE ADULT - PROBLEM SELECTOR PROBLEM 3
GIB (gastrointestinal bleeding)
GIB (gastrointestinal bleeding)
Pyelonephritis
GIB (gastrointestinal bleeding)
Pyelonephritis
Pyelonephritis
2019 novel coronavirus disease (COVID-19)
2019 novel coronavirus disease (COVID-19)
Pyelonephritis

## 2022-06-28 NOTE — PROGRESS NOTE ADULT - PROBLEM SELECTOR PLAN 3
- p/w Hb 9.1, reports of dark stool  - occult +  - tolerating regular diet  - c/w PPI BID--> changed to po   - refusing labs  - refused colonoscopy

## 2022-06-28 NOTE — PROGRESS NOTE ADULT - PROBLEM SELECTOR PLAN 4
- on room air, no indication for steroids  - Not a candidate for Remdesivir 2/2 to renal failure  - Monitor oxygen saturation  - continue supportive measures

## 2022-06-28 NOTE — PROGRESS NOTE ADULT - ASSESSMENT
81 year old female from home with HHA, with PMHx of CVA, HTN, HLD, SBO, renal insufficiency with chronic Perez who presented to the hospital with  vomiting, black stool, cough, and found to be COVID + with transaminitis , and Creat 6, K 6.3, bicarb 8, pH 7.04. Admitted to the ICU for acute renal failure and metabolic acidosis, s/p bicarb drip. Nephro consulted and patient refused HD. There was also concern for GIB given Hgb 9.1, family decided on conservative refusing colonoscopy. Hospital course c/b Pyelonephritis, urine cx positive for Proteus Mirabilis ESBL. ID Followed and started on Meropenem and completed on 6/27/22. Palliative following and patient refusing further lab draws, discussed with son and both agree to home with home hospice. Plan for discharge home 6/29, needs to be on isolation for 14 days prior to HHA being reinstated.

## 2022-06-28 NOTE — CHART NOTE - NSCHARTNOTESELECT_GEN_ALL_CORE
Family Update Note/Event Note
Family udpate/Event Note
Nutrition Services
Event Note
Family Update Note/Event Note
Family update/Event Note
ICU Downgrade/Event Note
Spoke with family/Event Note

## 2022-06-28 NOTE — PROGRESS NOTE ADULT - PROBLEM SELECTOR PLAN 1
- Presented with Cr 8.28, BUN 93  - s/p bicarb drip  - Refusing HD  - DNR/DNI no invasive or aggressive measures, no central lines  - Palliative following  - Fluid restriction 1liter/day, renal diet  - Nephro Dr. Coyle following

## 2022-06-28 NOTE — PROGRESS NOTE ADULT - SUBJECTIVE AND OBJECTIVE BOX
NP Note discussed with  Primary Attending    Patient is a 82y old  Female who presents with a chief complaint of renal failure (26 Jun 2022 11:12)      INTERVAL HPI/OVERNIGHT EVENTS: no new complaints        __________________________________________________  REVIEW OF SYSTEMS:    CONSTITUTIONAL: No fever,   EYES: no acute visual disturbances  NECK: No pain or stiffness  RESPIRATORY: No cough; No shortness of breath  CARDIOVASCULAR: No chest pain, no palpitations  GASTROINTESTINAL: No pain. No nausea or vomiting; No diarrhea   NEUROLOGICAL: No headache  MUSCULOSKELETAL: No joint pain, no muscle pain  GENITOURINARY: no dysuria, no frequency, no hesitancy  PSYCHIATRY: no depression , no anxiety  ALL OTHER  ROS negative        Vital Signs Last 24 Hrs  T(C): 37.4 (26 Jun 2022 21:29), Max: 37.4 (26 Jun 2022 21:29)  T(F): 99.4 (26 Jun 2022 21:29), Max: 99.4 (26 Jun 2022 21:29)  HR: 98 (26 Jun 2022 21:29) (84 - 98)  BP: 128/54 (26 Jun 2022 21:29) (125/45 - 128/54)  BP(mean): --  RR: 18 (26 Jun 2022 21:29) (18 - 18)  SpO2: 99% (26 Jun 2022 21:29) (99% - 100%)    ________________________________________________  PHYSICAL EXAM:  GENERAL: NAD  HEENT: Normocephalic;  conjunctivae and sclerae clear; + [place]  NECK : supple  CHEST/LUNG: Clear to auscultation bilaterally  HEART: S1 S2  regular; no murmurs,  ABDOMEN: Soft, Nontender, Nondistended; Bowel sounds present  EXTREMITIES: no cyanosis; no edema;   SKIN: warm and dry; no rash  NERVOUS SYSTEM:  Awake and alert; Oriented  to place, person and time ; no new deficits    _________________________________________________  LABS: no new labs   MEDICATIONS  (STANDING):  pantoprazole    Tablet 40 milliGRAM(s) Oral two times a day    MEDICATIONS  (PRN):  acetaminophen     Tablet .. 650 milliGRAM(s) Oral every 6 hours PRN Temp greater or equal to 38C (100.4F), Mild Pain (1 - 3)            RADIOLOGY & ADDITIONAL TESTS:    < from: Xray Chest 1 View- PORTABLE-Urgent (06.15.22 @ 21:01) >  IMPRESSION: No acute finding.    < end of copied text >      Imaging Personally Reviewed:  YES        Care Discussed with Consultants :     Plan of care was discussed with patient and /or primary care giver; all questions and concerns were addressed and care was aligned with patient's wishes.

## 2022-06-28 NOTE — PROGRESS NOTE ADULT - PROBLEM SELECTOR PROBLEM 1
Acute renal failure
2019 novel coronavirus disease (COVID-19)
2019 novel coronavirus disease (COVID-19)
Acute renal failure
ESRD (end stage renal disease)
Acute renal failure
2019 novel coronavirus disease (COVID-19)
2019 novel coronavirus disease (COVID-19)
ESRD (end stage renal disease)

## 2022-06-28 NOTE — PROGRESS NOTE ADULT - PROBLEM SELECTOR PLAN 2
- urine cx positive for Proteus mirabilis ESBL  - Chronic cardoso  - afebrile, no leukocytosis  - Continue Meropenem until 6/28  - ID Dr. Schaefer following

## 2022-06-28 NOTE — PROGRESS NOTE ADULT - PROBLEM SELECTOR PROBLEM 4
Anemia secondary to renal failure
COVID
Anemia secondary to renal failure
COVID
Anemia secondary to renal failure
COVID
GIB (gastrointestinal bleeding)
Anemia secondary to renal failure
GIB (gastrointestinal bleeding)

## 2022-06-29 VITALS
RESPIRATION RATE: 17 BRPM | OXYGEN SATURATION: 100 % | DIASTOLIC BLOOD PRESSURE: 49 MMHG | HEART RATE: 82 BPM | TEMPERATURE: 98 F | SYSTOLIC BLOOD PRESSURE: 121 MMHG

## 2022-06-29 PROCEDURE — 83690 ASSAY OF LIPASE: CPT

## 2022-06-29 PROCEDURE — 85730 THROMBOPLASTIN TIME PARTIAL: CPT

## 2022-06-29 PROCEDURE — 87077 CULTURE AEROBIC IDENTIFY: CPT

## 2022-06-29 PROCEDURE — 80076 HEPATIC FUNCTION PANEL: CPT

## 2022-06-29 PROCEDURE — 82272 OCCULT BLD FECES 1-3 TESTS: CPT

## 2022-06-29 PROCEDURE — 83935 ASSAY OF URINE OSMOLALITY: CPT

## 2022-06-29 PROCEDURE — 85027 COMPLETE CBC AUTOMATED: CPT

## 2022-06-29 PROCEDURE — 82962 GLUCOSE BLOOD TEST: CPT

## 2022-06-29 PROCEDURE — 87086 URINE CULTURE/COLONY COUNT: CPT

## 2022-06-29 PROCEDURE — 80053 COMPREHEN METABOLIC PANEL: CPT

## 2022-06-29 PROCEDURE — 99285 EMERGENCY DEPT VISIT HI MDM: CPT | Mod: 25

## 2022-06-29 PROCEDURE — 84300 ASSAY OF URINE SODIUM: CPT

## 2022-06-29 PROCEDURE — 82330 ASSAY OF CALCIUM: CPT

## 2022-06-29 PROCEDURE — 82550 ASSAY OF CK (CPK): CPT

## 2022-06-29 PROCEDURE — 71045 X-RAY EXAM CHEST 1 VIEW: CPT

## 2022-06-29 PROCEDURE — 87641 MR-STAPH DNA AMP PROBE: CPT

## 2022-06-29 PROCEDURE — 96375 TX/PRO/DX INJ NEW DRUG ADDON: CPT

## 2022-06-29 PROCEDURE — 82570 ASSAY OF URINE CREATININE: CPT

## 2022-06-29 PROCEDURE — 83735 ASSAY OF MAGNESIUM: CPT

## 2022-06-29 PROCEDURE — 81001 URINALYSIS AUTO W/SCOPE: CPT

## 2022-06-29 PROCEDURE — 85610 PROTHROMBIN TIME: CPT

## 2022-06-29 PROCEDURE — 99239 HOSP IP/OBS DSCHRG MGMT >30: CPT

## 2022-06-29 PROCEDURE — 87040 BLOOD CULTURE FOR BACTERIA: CPT

## 2022-06-29 PROCEDURE — 86900 BLOOD TYPING SEROLOGIC ABO: CPT

## 2022-06-29 PROCEDURE — 36415 COLL VENOUS BLD VENIPUNCTURE: CPT

## 2022-06-29 PROCEDURE — 87186 SC STD MICRODIL/AGAR DIL: CPT

## 2022-06-29 PROCEDURE — 84100 ASSAY OF PHOSPHORUS: CPT

## 2022-06-29 PROCEDURE — 82803 BLOOD GASES ANY COMBINATION: CPT

## 2022-06-29 PROCEDURE — 86850 RBC ANTIBODY SCREEN: CPT

## 2022-06-29 PROCEDURE — 96365 THER/PROPH/DIAG IV INF INIT: CPT

## 2022-06-29 PROCEDURE — 86703 HIV-1/HIV-2 1 RESULT ANTBDY: CPT

## 2022-06-29 PROCEDURE — 80048 BASIC METABOLIC PNL TOTAL CA: CPT

## 2022-06-29 PROCEDURE — 93005 ELECTROCARDIOGRAM TRACING: CPT

## 2022-06-29 PROCEDURE — 94640 AIRWAY INHALATION TREATMENT: CPT

## 2022-06-29 PROCEDURE — 86901 BLOOD TYPING SEROLOGIC RH(D): CPT

## 2022-06-29 PROCEDURE — 83605 ASSAY OF LACTIC ACID: CPT

## 2022-06-29 PROCEDURE — 84132 ASSAY OF SERUM POTASSIUM: CPT

## 2022-06-29 PROCEDURE — 84295 ASSAY OF SERUM SODIUM: CPT

## 2022-06-29 PROCEDURE — 87635 SARS-COV-2 COVID-19 AMP PRB: CPT

## 2022-06-29 PROCEDURE — 84484 ASSAY OF TROPONIN QUANT: CPT

## 2022-06-29 PROCEDURE — 85025 COMPLETE CBC W/AUTO DIFF WBC: CPT

## 2022-06-29 PROCEDURE — 84478 ASSAY OF TRIGLYCERIDES: CPT

## 2022-06-29 PROCEDURE — 87640 STAPH A DNA AMP PROBE: CPT

## 2022-06-29 RX ORDER — PANTOPRAZOLE SODIUM 20 MG/1
1 TABLET, DELAYED RELEASE ORAL
Qty: 60 | Refills: 0
Start: 2022-06-29

## 2022-06-29 RX ADMIN — PANTOPRAZOLE SODIUM 40 MILLIGRAM(S): 20 TABLET, DELAYED RELEASE ORAL at 05:20

## 2022-06-29 NOTE — DISCHARGE NOTE NURSING/CASE MANAGEMENT/SOCIAL WORK - PATIENT PORTAL LINK FT
You can access the FollowMyHealth Patient Portal offered by [facility] by registering at the following website: [email or web address]. By joining [employer]’s FollowMyHealth portal, you will also be able to view your health information using other applications (apps) compatible with our system.

## 2022-06-29 NOTE — DISCHARGE NOTE NURSING/CASE MANAGEMENT/SOCIAL WORK - NSDCPEFALRISK_GEN_ALL_CORE
For information on Fall & Injury Prevention, visit: [email or web address] OR  [email or web address] OR  https://www.cdc.gov/steadi/patient.html

## 2022-12-01 NOTE — PATIENT PROFILE ADULT - NSFALLSECTIONLABEL_GEN_A_CORE
An intravascular ultrasound (IVUS) was performed in the circumflex artery using an (Catheter Nc Trek 4mm 8mm Rx Radopq Smth Rnd Tip Ptca) ultrasound catheter.  .

## 2023-07-29 NOTE — DISCHARGE NOTE NURSING/CASE MANAGEMENT/SOCIAL WORK - NSDPLANG ASIS_GEN_ALL_CORE
Consultation - Cardiology Team One  Saranya Welsh 65 y.o. male MRN: [id number]  Unit/Bed#: S -86 Encounter: [id number]    Inpatient consult to Cardiology  Consult performed by: SAM Harmon  Consult ordered by: Paty Simmons MD          Physician Requesting Consult: Samira Guadalupe MD     Reason for Consult / Principal Problem: cardiomyopathy      Assessment/ Plan         1. Cardiomyopathy: LVEF 20-25% on echo yesterday; has been this low since at least 2021; he has had abnormal ST at [facility] 10/21 but has declined cardiac angiography. He has been prescribed toprol xl in past; not taking as OP. Resumed here and agree. Long discussion with patient about medical therapy and ongoing workup for his cardiomyopathy; he said he would consider left heart cath at some point. He is agreeable to BB but no other meds at this time. Continue metoprolol succinate 25mg daily  If agreeable would also add GDMT in form of ACE if patient acceptable. Presecribed torsemide 20ng BID as OP; unclear if he was taking; this is being continud here by neprhology. He is compensated on exam without any active cardiac symptoms. Will have office call him to arrange OP follow up to discuss further medical therapy nd testing, ICD if agreeable; pt agreeable to this plan. 2. Presumed CAD: pt had abnormal ST 10/2021 at [facility] with significant reversible ischemia in inferoapical wall. Declined heart cathterization. Declines ASA, statin therapy now and in the past.     3. Infected HD cath: reason for admission; removed yesterday; BC pending; on IV abx. Temp cath placed with IR 7/28    3. ESRD: HD MWF as OP; neprhology following; he had line infection and removal yesterday with temp HD cath placed in IR. Still getting torsemide per nephrology    4. Paroxysmal atrial fibrillation: one episodes in past,breif in setting of sepsis; he is not on anticoauglation.        History of Present Illness   HPI: Saranya Welsh is a 65y.o. year old male who has a history of chronic HFrEF 20%, ESRD on HD, PAF (not on AC), HTN, presumed CAD based on abnormal ST, PVD/PAD. He follows with cardiologist Dr. Zoë Anaya, last seen in office 11/2021. Pt presented to ED yesterday from OP HD center with purulent drainage from his HD catheter. Catheter was removed yesterday and temp cath placed in IR. Cultures are pending. He is on IV abx. He has a hx of cardiomyopathy with LVEF in 20% range dating back to at least 2021. A repeat echo was done yesterday that showed EF 20-25% and cardiology was asked to see today regarding this. At time of my exam pt repors feeling well. He is aware of his cardiomyopathy; we reviewed the current echo findings. He does not follow regularly with cardiology; last seen in 2021; he has declined medical therapy and further testing for cardiomyopathy and CAD in the past. He did have an abnormal ST at [facility] in 2021 that showed reversible ischemia in inferior apical walls but he declined coronary angiography. He is not having any chest pain or exertional SOB. He does have chronic orthopnea that is unchanged. He is on HD and is prescribed torsemide by nephrology but has not been taking meds at least for hte past few weeks. He tells me he prefers to try to fix his heart with natural remedies and exercise. Again we reviewed the recommendations for medical therapy in form on BB and ACE/ARB etc as well as further testing regarding his cardiomyopathy and likely underlying ischemic cause, need for heart cath etc. He is agreeable to starting low dose BB at this time and will consider other testing meds in the further. Echocardiogram 7/28/2023: LVEF 20-25%, mild concentric hypertorphy, global hypokinesis with regional variations, abnormal Diastolic function, R dilated with mod reduced systolic function, dilated L and R Atrium, trace aotic regurgitation, mild to mod MR, mod to severe TR.      Telemetry reviewed personally:   Sinus rhythm, no events    Review of Systems   Constitutional: Negative for decreased appetite and fever. Cardiovascular: Positive for orthopnea. Negative for chest pain, dyspnea on exertion, irregular heartbeat, leg swelling and syncope. Respiratory: Negative for cough and shortness of breath. Gastrointestinal: Negative for abdominal pain, nausea and vomiting. Genitourinary: Negative for dysuria. Neurological: Negative for dizziness and light-headedness. Psychiatric/Behavioral: Negative for altered mental status. All other systems reviewed and are negative. Historical Information   Past Medical History:   Diagnosis Date   • Complication of renal dialysis    • Polycystic kidney disease      Past Surgical History:   Procedure Laterality Date   • IR CATHETERGRAM  10/17/2019   • IR IMAGE GUIDED ASPIRATION / DRAINAGE  9/23/2019   • IR PARACENTESIS  10/9/2019   • IR TEMPORARY DIALYSIS CATHETER PLACEMENT  7/28/2023   • IR TUNNELED CENTRAL LINE CHECK/CHANGE/REPOSITION  12/14/2021   • IR TUNNELED DIALYSIS CATHETER CHECK/CHANGE/REPOSITION/ANGIOPLASTY  10/17/2019   • IR TUNNELED DIALYSIS CATHETER PLACEMENT  9/30/2019   • IR TUNNELED DIALYSIS CATHETER REMOVAL  7/28/2023   • SPLIT THICKNESS SKIN GRAFT Bilateral 11/7/2019    Procedure: SKIN GRAFT SPLIT THICKNESS (STSG)  EXTREMITY;  Surgeon: Ra Pulido MD;  [facility];  Service: Plastics   • SPLIT THICKNESS SKIN GRAFT Bilateral 11/12/2019    Procedure: SKIN GRAFT SPLIT THICKNESS (STSG)  EXTREMITY;  Surgeon: Ra Pulido MD;  [facility];  Service: Plastics   • VAC DRESSING APPLICATION Bilateral 04/82/9764    Procedure: CHANGE DRESSING;  Surgeon: Ra Pulido MD;  [facility];  Service: Plastics   • WOUND DEBRIDEMENT Bilateral 10/31/2019    Procedure: DEBRIDEMENT WOUND [facility]);   Surgeon: Jeannine Victor DPM;  [facility];  Service: Podiatry   • WOUND DEBRIDEMENT Bilateral 11/5/2019 Procedure: DEBRIDEMENT WOUND [facility]); Surgeon: Rob Byers DPM;  [facility];  Service: Podiatry   • WOUND DEBRIDEMENT Bilateral 11/7/2019    Procedure: DEBRIDEMENT WOUND [facility]); Surgeon: Sreedhar Erickson MD;  [facility];  Service: Plastics   • WOUND DEBRIDEMENT Bilateral 11/12/2019    Procedure: DEBRIDEMENT LOWER EXTREMITY [facility]); Surgeon: Sreedhar Erickson MD;  [facility];  Service: Plastics     Social History     Substance and Sexual Activity   Alcohol Use Not Currently     Social History     Substance and Sexual Activity   Drug Use Not Currently     Social History     Tobacco Use   Smoking Status Never   Smokeless Tobacco Never     Family History: History reviewed. No pertinent family history.     Meds/Allergies   current meds:   Current Facility-Administered Medications   Medication Dose Route Frequency   • acetaminophen (TYLENOL) tablet 975 mg  975 mg Oral Q8H [address]   • b complex-vitamin C-folic acid (RENAL) capsule 1 capsule  1 capsule Oral Daily With Dinner   • [START ON 7/31/2023] calcitriol (ROCALTROL) capsule 0.75 mcg  0.75 mcg Oral Once per day on Mon Wed Fri   • calcium acetate (PHOSLO) capsule 1,334 mg  1,334 mg Oral TID With Meals   • cholecalciferol (VITAMIN D3) tablet 1,000 Units  1,000 Units Oral Daily   • hydrALAZINE (APRESOLINE) injection 10 mg  10 mg Intravenous Q4H PRN   • metoprolol succinate (TOPROL-XL) 24 hr tablet 25 mg  25 mg Oral Daily   • multivitamin stress formula tablet 1 tablet  1 tablet Oral Daily   • naloxone (NARCAN) 0.04 mg/mL syringe 0.04 mg  0.04 mg Intravenous Q1MIN PRN   • oxyCODONE (ROXICODONE) IR tablet 5 mg  5 mg Oral Q4H PRN   • oxyCODONE (ROXICODONE) split tablet 2.5 mg  2.5 mg Oral Q4H PRN   • senna-docusate sodium (SENOKOT S) 8.6-50 mg per tablet 1 tablet  1 tablet Oral HS   • sevelamer (RENAGEL) tablet 800 mg  800 mg Oral TID With Meals   • simethicone (MYLICON) chewable tablet 80 mg  80 mg Oral Q6H PRN   • torsemide (DEMADEX) tablet 40 mg  40 mg Oral BID   • [START ON 7/31/2023] vancomycin (VANCOCIN) IVPB (premix in dextrose) 750 mg 150 mL  10 mg/kg Intravenous Once per day on Mon Wed Fri   • vancomycin (VANCOCIN) IVPB (premix in dextrose) 750 mg 150 mL  10 mg/kg Intravenous Once          Allergies   Allergen Reactions   • Sucroferric Oxyhydroxide Other (See Comments)   • Chlorhexidine Other (See Comments)   • Heparin Other (See Comments)     Brings plaletes down   • Other Other (See Comments)       Objective   Vitals: Blood pressure 140/98, pulse 85, temperature 97.6 °F (36.4 °C), resp. rate 16, height 5' 8" (1.727 m), weight 84 kg (185 lb 3 oz), SpO2 100 %. ,     Body mass index is 28.16 kg/m². ,     Systolic (17BET), EDN:037 , Min:136 , LQD:960     Diastolic (42FBQ), OOQ:431, Min:92, Max:109            Intake/Output Summary (Last 24 hours) at 7/29/2023 0904  Last data filed at 7/29/2023 8723  Gross per 24 hour   Intake 120 ml   Output --   Net 120 ml     Weight (last 2 days)     Date/Time Weight    07/29/23 0832 84 (185.19)    07/28/23 1330 81.2 (179)    07/28/23 1201 81.2 (179.01)        Invasive Devices     Central Venous Catheter Line  Duration           CVC Central Lines 07/28/23 Double 1 day          Peripheral Intravenous Line  Duration           Peripheral IV 07/28/23 Left Antecubital 1 day          Hemodialysis Catheter  Duration           HD Temporary Double Catheter <1 day                  Physical Exam  Vitals reviewed. Constitutional:       General: He is not in acute distress. Appearance: Normal appearance. HENT:      Head: Normocephalic. Mouth/Throat:      Mouth: Mucous membranes are moist.   Cardiovascular:      Rate and Rhythm: Normal rate and regular rhythm. Heart sounds: S1 normal and S2 normal. No murmur heard. Pulmonary:      Effort: Pulmonary effort is normal.      Breath sounds: Normal breath sounds. No wheezing or rales. Comments:  On RA  Musculoskeletal:      Right lower leg: No edema. Left lower leg: No edema. Skin:     General: Skin is warm. Neurological:      Mental Status: He is alert and oriented to person, place, and time.    Psychiatric:         Mood and Affect: Mood normal.       LABORATORY RESULTS:      CBC with diff:   Results from last 7 days   Lab Units 23  0530 23  0655   WBC Thousand/uL 8.29 5.68   HEMOGLOBIN g/dL 12.8 12.7   HEMATOCRIT % 39.8 40.2   MCV fL 98 99*   PLATELETS Thousands/uL 117* 104*   RBC Million/uL 4.08 4.08   MCH pg 31.4 31.1   MCHC g/dL 32.2 31.6   RDW % 14.8 14.7   MPV fL 11.9 10.4   NRBC AUTO /100 WBCs 0 0       CMP:  Results from last 7 days   Lab Units 23  0530 23  0655   POTASSIUM mmol/L 5.9* 5.4*   CHLORIDE mmol/L 96 92*   CO2 mmol/L 21 29   BUN mg/dL 93* 81*   CREATININE mg/dL 9.55* 8.97*   CALCIUM mg/dL 9.7 10.4*   AST U/L  --  5*   ALT U/L  --  8   ALK PHOS U/L  --  108*   EGFR ml/min/1.73sq m 5 5       BMP:  Results from last 7 days   Lab Units 23  0530 23  0655   POTASSIUM mmol/L 5.9* 5.4*   CHLORIDE mmol/L 96 92*   CO2 mmol/L 21 29   BUN mg/dL 93* 81*   CREATININE mg/dL 9.55* 8.97*   CALCIUM mg/dL 9.7 10.4*          No results found for: "NTBNP"         Results from last 7 days   Lab Units 23  0655   MAGNESIUM mg/dL 2.5                     Results from last 7 days   Lab Units 23  1313   INR  1.27*     Lipid Profile:   No results found for: "CHOL"  No results found for: "HDL"  No results found for: "[facility]"  No results found for: "TRIG"      Cardiac testing:   Results for orders placed during the hospital encounter of 20    Echo complete with contrast if indicated    Narrative  [id number] [place] [address] 43  [address] [facility]  [address]  [phone]    Transthoracic Echocardiogram  2D, M-mode, Doppler, and Color Doppler    Study date:  2020    Patient: Saqib Milian  MR number: [id number]  Account number: [de-identified]  : 1961  Age: 63 years  Gender: Male  Status: Outpatient  Location: [address] [place] [address] and [facility]  Height: 68 in  Weight: 164.6 lb  BP: 122/ 84 mmHg    Indications: Atrial fibrillation    Diagnoses: I48.0 - Atrial fibrillation    Sonographer:  Makenzie Valdez RDCS  Referring Physician:  Jose Elias Capps. Brodie Choi MD  Group:  [facility]  Cardiology Fellow: Chavo Hamilton MD  Interpreting Physician:  Marbella Hernandez MD    SUMMARY    LEFT VENTRICLE:  The ventricle was markedly dilated. Systolic function was severely reduced by visual assessment. Ejection fraction was estimated to be 20 %. There was severe diffuse hypokinesis. Mild spontaneous echo contrast was present. Left ventricular diastolic function parameters were abnormal.    RIGHT VENTRICLE:  The ventricle was mildly dilated. Systolic function was mildly reduced. LEFT ATRIUM:  The atrium was moderately dilated. RIGHT ATRIUM:  The atrium was mildly dilated. MITRAL VALVE:  There was moderate regurgitation. TRICUSPID VALVE:  There was moderate regurgitation. PULMONIC VALVE:  There was trace regurgitation. HISTORY: PRIOR HISTORY: GERD. Atrial fibrillation. Hypertension. ESRD. Ascites. Polycystic liver disease. PROCEDURE: The study was performed in the [address] [facility] and [facility]. This was a routine study. The transthoracic approach was used. The study included complete 2D imaging, M-mode, complete spectral Doppler, and color Doppler. The  heart rate was 94 bpm, at the start of the study. Images were obtained from the parasternal, apical, subcostal, and suprasternal notch acoustic windows. Image quality was adequate. LEFT VENTRICLE: The ventricle was markedly dilated. Systolic function was severely reduced by visual assessment. Ejection fraction was estimated to be 20 %. There was severe diffuse hypokinesis. Mild spontaneous echo contrast was present.   Wall thickness was normal. DOPPLER: Left ventricular diastolic function parameters were abnormal.    RIGHT VENTRICLE: The ventricle was mildly dilated. Systolic function was mildly reduced. Wall thickness was normal.    LEFT ATRIUM: The atrium was moderately dilated. RIGHT ATRIUM: The atrium was mildly dilated. MITRAL VALVE: Valve structure was normal. There was normal leaflet separation. DOPPLER: The transmitral velocity was within the normal range. There was no evidence for stenosis. There was moderate regurgitation. AORTIC VALVE: The valve was trileaflet. Leaflets exhibited mildly increased thickness, mild calcification, normal cuspal separation, and sclerosis. DOPPLER: Transaortic velocity was within the normal range. There was no evidence for  stenosis. There was no significant regurgitation. TRICUSPID VALVE: The valve structure was normal. There was normal leaflet separation. DOPPLER: The transtricuspid velocity was within the normal range. There was no evidence for stenosis. There was moderate regurgitation. PULMONIC VALVE: Leaflets exhibited normal thickness, no calcification, and normal cuspal separation. DOPPLER: The transpulmonic velocity was within the normal range. There was trace regurgitation. PERICARDIUM: There was no pericardial effusion. The pericardium was normal in appearance. AORTA: The root exhibited normal size. SYSTEMIC VEINS: IVC: The inferior vena cava was normal in size.     SYSTEM MEASUREMENT TABLES    2D  %FS: 5.95 %  Ao Diam: 3.44 cm  EDV(Teich): 224.36 ml  EF Biplane: 11.72 %  EF(Cube): 16.82 %  EF(Teich): 13.02 %  ESV(Cube): 240.2 ml  ESV(Teich): 195.15 ml  IVSd: 0.81 cm  LA Area: 28.18 cm2  LA Diam: 4.93 cm  LVEDV MOD A2C: 201.94 ml  LVEDV MOD A4C: 187.47 ml  LVEDV MOD BP: 200.89 ml  LVEF MOD A2C: 13.18 %  LVEF MOD A4C: 12.21 %  LVESV MOD A2C: 175.32 ml  LVESV MOD A4C: 164.57 ml  LVESV MOD BP: 177.34 ml  LVIDd: 6.61 cm  LVIDs: 6.22 cm  LVLd A2C: 10.21 cm  LVLd A4C: 9.55 cm  LVLs A2C: 9.95 cm  LVLs A4C: 9.02 cm  LVPWd: 0.87 cm  RA Area: 19.32 cm2  RV Diam.: 4.36 cm  SI(Cube): 25.7 ml/m2  SI(Teich): 15.46 ml/m2  SV MOD A2C: 26.62 ml  SV MOD A4C: 22.89 ml  SV(Cube): 48.57 ml  SV(Teich): 29.21 ml    CW  TR Vmax: 2.8 m/s  TR maxP.73 mmHg    MM  TAPSE: 0.89 cm    [facility] Accredited Echocardiography Laboratory    Prepared and electronically signed by    Johnny Martinez MD  Signed 2020 15:46:12    Imaging: I have personally reviewed pertinent reports. IR tunneled dialysis catheter removal    Result Date: 2023  Narrative: PROCEDURE: 1.  Image-guided non-tunneled dialysis catheter placement 2. Right internal jugular vein tunneled hemodialysis catheter removal STAFF: Mojgan Sabillon M.D. FLUOROSCOPY TIME: 8.1 minutes. NUMBER OF IMAGES: Multiple. COMPLICATIONS: None. MEDICATIONS: 1% lidocaine Moderate sedation was monitored by radiology nursing staff. Monitoring of conscious sedation time totaled 0 minutes. INDICATION: 71-year-old male with end-stage renal disease presenting with right internal jugular vein tunneled dialysis catheter infection who also requires central venous access for hemodialysis. PROCEDURE: The right neck and upper chest were prepped and draped in the usual sterile fashion. All elements of maximal sterile barrier technique were followed (cap, mask, sterile gown, sterile gloves, large sterile sheet, hand hygiene, and 2% chlorhexidine for cutaneous antisepsis). Using blunt dissection the right tunneled hemodialysis catheter cuff was freed and the catheter was removed in its entirety. The catheter tip was cut and sent for cultures. Large volume of purulent fluid was expressed from the soft tissue tunnel. A sterile dressing was then applied. Next the left neck and upper chest were prepped and draped in the usual sterile fashion.  All elements of maximal sterile barrier technique were followed (cap, mask, sterile gown, sterile gloves, large sterile sheet, hand hygiene, and 2% chlorhexidine for  cutaneous antisepsis). Using real-time ultrasound guidance utilizing a sterile ultrasound cover and gel, the left internal jugular vein was punctured at a site to facilitate conversion to a tunneled dialysis catheter in the future, if needed. An image was stored in PACS. Using this access, a 24 centimeter, 15 [employer] Hem[employer] non-tunneled dialysis catheter was placed under fluoroscopic guidance with its tip in the right atrium. The catheter may be used immediately. FINDINGS: Ultrasound showed an anechoic and compressible left internal jugular vein. Impression: 1. Non-tunneled dialysis catheter placement. 2.  Removal of infected right internal jugular vein tunneled hemodialysis catheter with expression of large volume of purulent fluid from the soft tissue tunnel. Workstation performed: [id number]     IR temporary dialysis catheter placement    Result Date: 7/28/2023  Narrative: PROCEDURE: 1.  Image-guided non-tunneled dialysis catheter placement 2. Right internal jugular vein tunneled hemodialysis catheter removal STAFF: Amy Anderson M.D. FLUOROSCOPY TIME: 8.1 minutes. NUMBER OF IMAGES: Multiple. COMPLICATIONS: None. MEDICATIONS: 1% lidocaine Moderate sedation was monitored by radiology nursing staff. Monitoring of conscious sedation time totaled 0 minutes. INDICATION: 41-year-old male with end-stage renal disease presenting with right internal jugular vein tunneled dialysis catheter infection who also requires central venous access for hemodialysis. PROCEDURE: The right neck and upper chest were prepped and draped in the usual sterile fashion. All elements of maximal sterile barrier technique were followed (cap, mask, sterile gown, sterile gloves, large sterile sheet, hand hygiene, and 2% chlorhexidine for cutaneous antisepsis). Using blunt dissection the right tunneled hemodialysis catheter cuff was freed and the catheter was removed in its entirety. The catheter tip was cut and sent for cultures.  Large volume of purulent fluid was expressed from the soft tissue tunnel. A sterile dressing was then applied. Next the left neck and upper chest were prepped and draped in the usual sterile fashion. All elements of maximal sterile barrier technique were followed (cap, mask, sterile gown, sterile gloves, large sterile sheet, hand hygiene, and 2% chlorhexidine for  cutaneous antisepsis). Using real-time ultrasound guidance utilizing a sterile ultrasound cover and gel, the left internal jugular vein was punctured at a site to facilitate conversion to a tunneled dialysis catheter in the future, if needed. An image was stored in PACS. Using this access, a 24 centimeter, 15 [place] [employer] Hemoflow non-tunneled dialysis catheter was placed under fluoroscopic guidance with its tip in the right atrium. The catheter may be used immediately. FINDINGS: Ultrasound showed an anechoic and compressible left internal jugular vein. Impression: 1. Non-tunneled dialysis catheter placement. 2.  Removal of infected right internal jugular vein tunneled hemodialysis catheter with expression of large volume of purulent fluid from the soft tissue tunnel. Workstation performed: [id number]     Echo complete w/ contrast if indicated    Result Date: 7/28/2023  Narrative: •  Left Ventricle: Left ventricular cavity size is mildly dilated. Wall thickness is mildly increased. There is mild concentric hypertrophy. The left ventricular ejection fraction is 20-25%. Systolic function is severely reduced. There is severe global hypokinesis with regional variation. There is spontaneous echo contrast noted. Diastolic function is abnormal. •  Right Ventricle: Right ventricular cavity size is mildly dilated. Systolic function is moderately reduced. •  Left Atrium: The atrium is severely dilated (>48 mL/m2). •  Right Atrium: The atrium is moderately dilated. •  Aortic Valve: There is trace regurgitation. There is aortic valve sclerosis.  •  Mitral Valve: There is mild annular calcification. There is mild subvalvular calcification. There is mild to moderate regurgitation. •  Tricuspid Valve: There is moderate to severe regurgitation. The right ventricular systolic pressure is mildly elevated. The estimated right ventricular systolic pressure is 19.60 mmHg. •  Pericardium: There is no pericardial effusion. •  Prior TTE study available for comparison. Prior study date: 2/17/2020. No significant changes noted compared to the prior study. Assessment  Principal Problem:    Hemodialysis catheter infection ([address])  Active Problems:    Hypertension    ESRD (end stage renal disease) on dialysis ([address])    HFrEF Dilated cardiomyopathy (HCC)    Chronic venous stasis dermatitis of both lower extremities    Thank you for allowing us to participate in this patient's care. This pt will follow up with          once discharged. Counseling / Coordination of Care  Total floor / unit time spent today 45 minutes. Greater than 50% of total time was spent with the patient and / or family counseling and / or coordination of care. A description of the counseling / coordination of care: Review of history, current assessment, development of a plan. Code Status: Level 1 - Full Code    ** Please Note: Dragon 360 Dictation voice to text software may have been used in the creation of this document.  ** No